# Patient Record
Sex: FEMALE | Race: WHITE | NOT HISPANIC OR LATINO | Employment: FULL TIME | URBAN - METROPOLITAN AREA
[De-identification: names, ages, dates, MRNs, and addresses within clinical notes are randomized per-mention and may not be internally consistent; named-entity substitution may affect disease eponyms.]

---

## 2023-09-08 PROBLEM — R40.0 DAYTIME SOMNOLENCE: Status: ACTIVE | Noted: 2023-09-08

## 2023-09-08 PROBLEM — J34.3 HYPERTROPHY OF BOTH INFERIOR NASAL TURBINATES: Status: ACTIVE | Noted: 2023-09-08

## 2023-09-08 PROBLEM — H25.10 NUCLEAR SENILE CATARACT: Status: ACTIVE | Noted: 2023-09-08

## 2023-09-08 PROBLEM — H02.834 DERMATOCHALASIS OF LEFT UPPER EYELID: Status: ACTIVE | Noted: 2023-09-08

## 2023-09-08 PROBLEM — G57.01 PIRIFORMIS SYNDROME, RIGHT: Status: ACTIVE | Noted: 2023-09-08

## 2023-09-08 PROBLEM — D35.3 BENIGN NEOPLASM OF PITUITARY GLAND AND CRANIOPHARYNGEAL DUCT (POUCH) (MULTI): Status: ACTIVE | Noted: 2023-09-08

## 2023-09-08 PROBLEM — H40.003 PREGLAUCOMA, UNSPECIFIED, BILATERAL: Status: ACTIVE | Noted: 2023-09-08

## 2023-09-08 PROBLEM — F41.0 PANIC DISORDER WITHOUT AGORAPHOBIA: Status: ACTIVE | Noted: 2023-09-08

## 2023-09-08 PROBLEM — H02.831 DERMATOCHALASIS OF RIGHT UPPER EYELID: Status: ACTIVE | Noted: 2023-09-08

## 2023-09-08 PROBLEM — I10 BENIGN ESSENTIAL HTN: Status: ACTIVE | Noted: 2023-09-08

## 2023-09-08 PROBLEM — G47.33 OBSTRUCTIVE SLEEP APNEA, ADULT: Status: ACTIVE | Noted: 2023-09-08

## 2023-09-08 PROBLEM — M54.16 LUMBAR RADICULOPATHY: Status: ACTIVE | Noted: 2023-09-08

## 2023-09-08 PROBLEM — E87.6 HYPOKALEMIA: Status: ACTIVE | Noted: 2023-09-08

## 2023-09-08 PROBLEM — R06.83 SNORING: Status: ACTIVE | Noted: 2023-09-08

## 2023-09-08 PROBLEM — H44.20 DEGENERATIVE PROGRESSIVE HIGH MYOPIA: Status: ACTIVE | Noted: 2023-09-08

## 2023-09-08 PROBLEM — F41.9 ANXIETY: Status: ACTIVE | Noted: 2023-09-08

## 2023-09-08 PROBLEM — H49.10 FOURTH NERVE PALSY: Status: ACTIVE | Noted: 2023-09-08

## 2023-09-08 PROBLEM — M54.30 SCIATICA: Status: ACTIVE | Noted: 2023-09-08

## 2023-09-08 PROBLEM — D35.2 BENIGN NEOPLASM OF PITUITARY GLAND AND CRANIOPHARYNGEAL DUCT (POUCH) (MULTI): Status: ACTIVE | Noted: 2023-09-08

## 2023-09-08 PROBLEM — E53.8 VITAMIN B12 DEFICIENCY: Status: ACTIVE | Noted: 2023-09-08

## 2023-09-08 RX ORDER — HYDROCHLOROTHIAZIDE 25 MG/1
1 TABLET ORAL
COMMUNITY
Start: 2016-07-16

## 2023-09-08 RX ORDER — CHOLECALCIFEROL (VITAMIN D3) 25 MCG
1 TABLET ORAL DAILY
COMMUNITY

## 2023-09-08 RX ORDER — BIOTIN 1 MG
1000 TABLET ORAL
COMMUNITY
Start: 2013-07-15

## 2023-09-08 RX ORDER — CYCLOBENZAPRINE HCL 10 MG
1 TABLET ORAL 3 TIMES DAILY PRN
COMMUNITY
Start: 2013-07-15 | End: 2024-04-01

## 2023-09-08 RX ORDER — OMEPRAZOLE 40 MG/1
1 CAPSULE, DELAYED RELEASE ORAL
COMMUNITY
End: 2023-10-09

## 2023-09-08 RX ORDER — POTASSIUM CHLORIDE 20 MEQ/1
1 TABLET, EXTENDED RELEASE ORAL 2 TIMES DAILY
COMMUNITY
Start: 2016-04-21 | End: 2023-12-19

## 2023-09-08 RX ORDER — BIOTIN 10 MG
1 TABLET ORAL DAILY
COMMUNITY

## 2023-09-08 RX ORDER — FOSINOPRIL SODIUM 40 MG/1
1 TABLET ORAL DAILY
COMMUNITY
End: 2024-05-16

## 2023-09-08 RX ORDER — AMLODIPINE BESYLATE 10 MG/1
1 TABLET ORAL DAILY
COMMUNITY
End: 2024-05-16

## 2023-09-08 RX ORDER — PAROXETINE 10 MG/1
3 TABLET, FILM COATED ORAL DAILY
COMMUNITY
End: 2024-02-13

## 2023-09-08 RX ORDER — ASPIRIN 81 MG/1
1 TABLET ORAL DAILY
COMMUNITY
Start: 2013-07-15

## 2023-09-08 RX ORDER — MULTIVIT WITH IRON,MINERALS
TABLET ORAL
COMMUNITY

## 2023-09-08 RX ORDER — PNV NO.95/FERROUS FUM/FOLIC AC 28MG-0.8MG
1 TABLET ORAL DAILY
COMMUNITY

## 2023-09-08 RX ORDER — METOPROLOL SUCCINATE 50 MG/1
1 TABLET, EXTENDED RELEASE ORAL DAILY
COMMUNITY
End: 2024-04-25

## 2023-09-08 RX ORDER — FLUTICASONE PROPIONATE 50 MCG
1 SPRAY, SUSPENSION (ML) NASAL DAILY
COMMUNITY

## 2023-10-08 DIAGNOSIS — K21.9 GASTROESOPHAGEAL REFLUX DISEASE, UNSPECIFIED WHETHER ESOPHAGITIS PRESENT: Primary | ICD-10-CM

## 2023-10-09 RX ORDER — OMEPRAZOLE 40 MG/1
40 CAPSULE, DELAYED RELEASE ORAL
Qty: 90 CAPSULE | Refills: 3 | Status: SHIPPED | OUTPATIENT
Start: 2023-10-09 | End: 2024-10-08

## 2023-11-20 ENCOUNTER — LAB (OUTPATIENT)
Dept: LAB | Facility: LAB | Age: 63
End: 2023-11-20
Payer: COMMERCIAL

## 2023-11-20 DIAGNOSIS — Z00.00 ENCOUNTER FOR GENERAL ADULT MEDICAL EXAMINATION WITHOUT ABNORMAL FINDINGS: Primary | ICD-10-CM

## 2023-11-20 DIAGNOSIS — D35.2 BENIGN NEOPLASM OF PITUITARY GLAND (MULTI): ICD-10-CM

## 2023-11-20 LAB
ALBUMIN SERPL-MCNC: 4.4 G/DL (ref 3.5–5)
ALP BLD-CCNC: 74 U/L (ref 35–125)
ALT SERPL-CCNC: 18 U/L (ref 5–40)
ANION GAP SERPL CALC-SCNC: 17 MMOL/L
AST SERPL-CCNC: 20 U/L (ref 5–40)
BASOPHILS # BLD AUTO: 0.03 X10*3/UL (ref 0–0.1)
BASOPHILS NFR BLD AUTO: 0.6 %
BILIRUB SERPL-MCNC: 0.4 MG/DL (ref 0.1–1.2)
BUN SERPL-MCNC: 19 MG/DL (ref 8–25)
CALCIUM SERPL-MCNC: 9.6 MG/DL (ref 8.5–10.4)
CHLORIDE SERPL-SCNC: 103 MMOL/L (ref 97–107)
CHOLEST SERPL-MCNC: 208 MG/DL (ref 133–200)
CHOLEST/HDLC SERPL: 3.8 {RATIO}
CO2 SERPL-SCNC: 23 MMOL/L (ref 24–31)
CREAT SERPL-MCNC: 0.9 MG/DL (ref 0.4–1.6)
EOSINOPHIL # BLD AUTO: 0.15 X10*3/UL (ref 0–0.7)
EOSINOPHIL NFR BLD AUTO: 3.2 %
ERYTHROCYTE [DISTWIDTH] IN BLOOD BY AUTOMATED COUNT: 13.4 % (ref 11.5–14.5)
GFR SERPL CREATININE-BSD FRML MDRD: 71 ML/MIN/1.73M*2
GLUCOSE SERPL-MCNC: 117 MG/DL (ref 65–99)
HCT VFR BLD AUTO: 38.2 % (ref 36–46)
HDLC SERPL-MCNC: 55 MG/DL
HGB BLD-MCNC: 12.3 G/DL (ref 12–16)
IMM GRANULOCYTES # BLD AUTO: 0.01 X10*3/UL (ref 0–0.7)
IMM GRANULOCYTES NFR BLD AUTO: 0.2 % (ref 0–0.9)
LDLC SERPL CALC-MCNC: 136 MG/DL (ref 65–130)
LYMPHOCYTES # BLD AUTO: 1.3 X10*3/UL (ref 1.2–4.8)
LYMPHOCYTES NFR BLD AUTO: 27.3 %
MCH RBC QN AUTO: 27.8 PG (ref 26–34)
MCHC RBC AUTO-ENTMCNC: 32.2 G/DL (ref 32–36)
MCV RBC AUTO: 86 FL (ref 80–100)
MONOCYTES # BLD AUTO: 0.49 X10*3/UL (ref 0.1–1)
MONOCYTES NFR BLD AUTO: 10.3 %
NEUTROPHILS # BLD AUTO: 2.78 X10*3/UL (ref 1.2–7.7)
NEUTROPHILS NFR BLD AUTO: 58.4 %
NRBC BLD-RTO: 0 /100 WBCS (ref 0–0)
PLATELET # BLD AUTO: 282 X10*3/UL (ref 150–450)
POTASSIUM SERPL-SCNC: 4 MMOL/L (ref 3.4–5.1)
PROLACTIN SERPL-MCNC: 13.3 UG/L (ref 3–20)
PROT SERPL-MCNC: 6.8 G/DL (ref 5.9–7.9)
RBC # BLD AUTO: 4.43 X10*6/UL (ref 4–5.2)
SODIUM SERPL-SCNC: 143 MMOL/L (ref 133–145)
TRIGL SERPL-MCNC: 84 MG/DL (ref 40–150)
WBC # BLD AUTO: 4.8 X10*3/UL (ref 4.4–11.3)

## 2023-11-20 PROCEDURE — 80053 COMPREHEN METABOLIC PANEL: CPT

## 2023-11-20 PROCEDURE — 80061 LIPID PANEL: CPT

## 2023-11-20 PROCEDURE — 85025 COMPLETE CBC W/AUTO DIFF WBC: CPT

## 2023-11-20 PROCEDURE — 84146 ASSAY OF PROLACTIN: CPT

## 2023-11-20 PROCEDURE — 36415 COLL VENOUS BLD VENIPUNCTURE: CPT

## 2023-12-19 DIAGNOSIS — I10 BENIGN ESSENTIAL HTN: Primary | ICD-10-CM

## 2023-12-19 RX ORDER — POTASSIUM CHLORIDE 1500 MG/1
20 TABLET, EXTENDED RELEASE ORAL
Qty: 180 TABLET | Refills: 0 | Status: SHIPPED | OUTPATIENT
Start: 2023-12-19 | End: 2024-03-18

## 2024-01-11 ENCOUNTER — TELEPHONE (OUTPATIENT)
Dept: PRIMARY CARE | Facility: CLINIC | Age: 64
End: 2024-01-11
Payer: COMMERCIAL

## 2024-01-11 DIAGNOSIS — Z12.31 VISIT FOR SCREENING MAMMOGRAM: Primary | ICD-10-CM

## 2024-01-11 NOTE — TELEPHONE ENCOUNTER
Needs order for 3D mammogram placed, as  always ordered them. Since Dr Lundberg  isn't in the office, would you or Dr SALINAS order her mammogram? Please advise patient when order is in the system, or if he needs to take an alternate route.

## 2024-02-02 ENCOUNTER — HOSPITAL ENCOUNTER (OUTPATIENT)
Dept: RADIOLOGY | Facility: CLINIC | Age: 64
Discharge: HOME | End: 2024-02-02
Payer: MEDICARE

## 2024-02-02 VITALS — HEIGHT: 64 IN | WEIGHT: 175 LBS | BODY MASS INDEX: 29.88 KG/M2

## 2024-02-02 DIAGNOSIS — Z12.31 VISIT FOR SCREENING MAMMOGRAM: ICD-10-CM

## 2024-02-02 PROCEDURE — 77067 SCR MAMMO BI INCL CAD: CPT

## 2024-02-13 DIAGNOSIS — F41.9 ANXIETY: Primary | ICD-10-CM

## 2024-02-13 RX ORDER — PAROXETINE 10 MG/1
TABLET, FILM COATED ORAL
Qty: 270 TABLET | Refills: 0 | Status: SHIPPED | OUTPATIENT
Start: 2024-02-13

## 2024-02-21 ENCOUNTER — CLINICAL SUPPORT (OUTPATIENT)
Dept: OPHTHALMOLOGY | Facility: CLINIC | Age: 64
End: 2024-02-21
Payer: COMMERCIAL

## 2024-02-21 ENCOUNTER — OFFICE VISIT (OUTPATIENT)
Dept: OPHTHALMOLOGY | Facility: CLINIC | Age: 64
End: 2024-02-21
Payer: COMMERCIAL

## 2024-02-21 ENCOUNTER — APPOINTMENT (OUTPATIENT)
Dept: OPHTHALMOLOGY | Facility: CLINIC | Age: 64
End: 2024-02-21
Payer: COMMERCIAL

## 2024-02-21 DIAGNOSIS — H40.003 PREGLAUCOMA, UNSPECIFIED, BILATERAL: Primary | ICD-10-CM

## 2024-02-21 DIAGNOSIS — H49.12 LEFT TROCHLEAR NERVE PALSY: ICD-10-CM

## 2024-02-21 DIAGNOSIS — H44.23 BILATERAL DEGENERATIVE PROGRESSIVE HIGH MYOPIA: ICD-10-CM

## 2024-02-21 DIAGNOSIS — H25.813 COMBINED FORMS OF AGE-RELATED CATARACT OF BOTH EYES: ICD-10-CM

## 2024-02-21 PROCEDURE — 99214 OFFICE O/P EST MOD 30 MIN: CPT | Performed by: OPHTHALMOLOGY

## 2024-02-21 PROCEDURE — 92133 CPTRZD OPH DX IMG PST SGM ON: CPT | Performed by: OPHTHALMOLOGY

## 2024-02-21 RX ORDER — SPIRONOLACTONE 25 MG/1
TABLET ORAL
COMMUNITY
Start: 2010-03-11

## 2024-02-21 ASSESSMENT — REFRACTION_WEARINGRX
OD_SPHERE: -5.50
OS_ADD: +2.50
OD_ADD: +2.50
OD_AXIS: 119
OD_CYLINDER: -1.25
OS_AXIS: 073
OS_SPHERE: -4.50
OS_CYLINDER: -2.25

## 2024-02-21 ASSESSMENT — PATIENT HEALTH QUESTIONNAIRE - PHQ9
2. FEELING DOWN, DEPRESSED OR HOPELESS: NOT AT ALL
SUM OF ALL RESPONSES TO PHQ9 QUESTIONS 1 AND 2: 0
1. LITTLE INTEREST OR PLEASURE IN DOING THINGS: NOT AT ALL

## 2024-02-21 ASSESSMENT — SLIT LAMP EXAM - LIDS
COMMENTS: 1+ BLEPHARITIS, 1+ DERMATOCHALASIS - UPPER LID
COMMENTS: 1+ BLEPHARITIS, 1+ DERMATOCHALASIS - UPPER LID

## 2024-02-21 ASSESSMENT — VISUAL ACUITY
OD_CC: 20/30
OD_CC+: -2
OS_CC+: -2
CORRECTION_TYPE: GLASSES
METHOD: SNELLEN - LINEAR
OS_CC: 20/30

## 2024-02-21 ASSESSMENT — ENCOUNTER SYMPTOMS
RESPIRATORY NEGATIVE: 0
CONSTITUTIONAL NEGATIVE: 0
CARDIOVASCULAR NEGATIVE: 0
PSYCHIATRIC NEGATIVE: 0
GASTROINTESTINAL NEGATIVE: 1
EYES NEGATIVE: 0
HEMATOLOGIC/LYMPHATIC NEGATIVE: 0
LOSS OF SENSATION IN FEET: 0
MUSCULOSKELETAL NEGATIVE: 0
ENDOCRINE NEGATIVE: 0
NEUROLOGICAL NEGATIVE: 0
OCCASIONAL FEELINGS OF UNSTEADINESS: 0
ALLERGIC/IMMUNOLOGIC NEGATIVE: 0
DEPRESSION: 0

## 2024-02-21 ASSESSMENT — PACHYMETRY
OS_CT(UM): 615
OD_CT(UM): 615

## 2024-02-21 ASSESSMENT — TONOMETRY
IOP_METHOD: GOLDMANN APPLANATION
OS_IOP_MMHG: 18
OD_IOP_MMHG: 18

## 2024-02-21 ASSESSMENT — EXTERNAL EXAM - RIGHT EYE: OD_EXAM: NORMAL

## 2024-02-21 ASSESSMENT — CUP TO DISC RATIO
OS_RATIO: 0.5
OD_RATIO: 0.6

## 2024-02-21 ASSESSMENT — PAIN SCALES - GENERAL: PAINLEVEL: 0-NO PAIN

## 2024-02-21 ASSESSMENT — EXTERNAL EXAM - LEFT EYE: OS_EXAM: NORMAL

## 2024-02-21 NOTE — PROGRESS NOTES
Subjective   Patient ID: Rae Davis is a 64 y.o. female.    Chief Complaint    Annual Exam       HPI    PT SAW OPTOM FOR GLASSES AND soft contact lens (SCL) EXAM. PT DENIES REFRACTION.    Complete and pre-glaucoma exam.  Being evaluated for GI issues.  Vision is good and stable.  No new problems or complaints.      Last edited by Tay Sanderson MD on 2/21/2024 11:23 AM.        No current outpatient medications on file. (Ophthalmology pharm classes)       Current Outpatient Medications (Other)   Medication Sig Dispense Refill    amLODIPine (Norvasc) 10 mg tablet Take 1 tablet (10 mg) by mouth once daily.      biotin 10 mg tablet Take 1 tablet (10 mg) by mouth once daily. FOR 30 DAYS      cholecalciferol (Vitamin D-3) 25 MCG (1000 UT) tablet Take 1 capsule by mouth once daily.      cyanocobalamin (Vitamin B-12) 100 mcg tablet Take 1 tablet (100 mcg) by mouth once daily.      cyclobenzaprine (Flexeril) 10 mg tablet Take 1 tablet (10 mg) by mouth 3 times a day as needed.      fluticasone (Flonase Allergy Relief) 50 mcg/actuation nasal spray Administer 1 spray into each nostril once daily. FOR 30 DAYS      fosinopril (Monopril) 40 mg tablet Take 1 tablet (40 mg) by mouth once daily.      hydroCHLOROthiazide (HYDRODiuril) 25 mg tablet Take 1 tablet (25 mg) by mouth once daily in the morning. Take before meals.      metoprolol succinate XL (Toprol-XL) 50 mg 24 hr tablet Take 1 tablet (50 mg) by mouth once daily.      multivitamin-Ca-iron-minerals tablet Take by mouth. As directed      omeprazole (PriLOSEC) 40 mg DR capsule Take 1 capsule (40 mg) by mouth once daily in the morning. Take before meals. 90 capsule 3    PARoxetine (Paxil) 10 mg tablet TAKE 3 TABLETS ONCE DAILY 270 tablet 0    pedi multivit 43-iron fumarate (Flintstones Complete, iron,) 18 mg iron tablet,chewable Chew 1 tablet. When donating blood      potassium chloride CR (Klor-Con M20) 20 mEq ER tablet TAKE 1 TABLET TWICE A DAY WITH FOOD 180 tablet 0     spironolactone (Aldactone) 25 mg tablet Take by mouth.      aspirin 81 mg EC tablet Take 1 tablet (81 mg) by mouth once daily.      biotin 1 mg tablet Take 1 tablet (1,000 mcg) by mouth. As directed      potassium gluconate 2.5 mEq tablet Take by mouth. As directed         Objective   Base Eye Exam       Visual Acuity (Snellen - Linear)         Right Left Both    Dist cc 20/30 -2 20/30 -2     Near cc   J2      Correction: Glasses              Tonometry (Goldmann Applanation, 10:08 AM)         Right Left    Pressure 18 18              Pupils         Dark Shape React APD    Right 4 Round 1 None    Left 4 Round 1 None              Extraocular Movement         Right Left     Full Full              Dilation       Both eyes: 1% Tropic 2.5% Phen @ 10:08 AM                  Slit Lamp and Fundus Exam       External Exam         Right Left    External Normal Normal              Slit Lamp Exam         Right Left    Lids/Lashes 1+ Blepharitis, 1+ Dermatochalasis - upper lid 1+ Blepharitis, 1+ Dermatochalasis - upper lid    Conjunctiva/Sclera normal bulbar and palepbral conjunctiva normal bulbar and palepbral conjunctiva    Cornea normal epi/stroma/endo and tear film normal epi/stroma/endo and tear film    Anterior Chamber normal anterior chamber, deep and quiet normal anterior chamber, deep and quiet    Iris pupil miotic pupil miotic    Lens 1+ Nuclear sclerosis, 1+ Cortical cataract 1+ Nuclear sclerosis, 1+ Cortical cataract    Anterior Vitreous vitreous syneresis vitreous syneresis              Fundus Exam         Right Left    Disc sloping rim sloping rim    C/D Ratio 0.6 0.5    Macula normal macula normal macula    Vessels normal retinal vessels normal retinal vessels    Periphery normal retinal periphery normal retinal periphery                  Refraction       Wearing Rx         Sphere Cylinder Axis Add    Right -5.50 -1.25 119 +2.50    Left -4.50 -2.25 073 +2.50                    Assessment/Plan   Problem List Items  Addressed This Visit          Eye/Vision problems    Degenerative progressive high myopia    Fourth nerve palsy    Preglaucoma, unspecified, bilateral - Primary     F/u one year full with oct nerve and hvf.          Relevant Orders    OCT, Optic Nerve - OU - Both Eyes (Completed)    Combined forms of age-related cataract of both eyes     Not visually significant.

## 2024-03-07 ENCOUNTER — APPOINTMENT (OUTPATIENT)
Dept: PRIMARY CARE | Facility: CLINIC | Age: 64
End: 2024-03-07
Payer: COMMERCIAL

## 2024-03-18 DIAGNOSIS — I10 BENIGN ESSENTIAL HTN: ICD-10-CM

## 2024-03-18 RX ORDER — POTASSIUM CHLORIDE 1500 MG/1
20 TABLET, EXTENDED RELEASE ORAL
Qty: 180 TABLET | Refills: 3 | Status: SHIPPED | OUTPATIENT
Start: 2024-03-18

## 2024-03-19 ENCOUNTER — APPOINTMENT (OUTPATIENT)
Dept: PRIMARY CARE | Facility: CLINIC | Age: 64
End: 2024-03-19
Payer: COMMERCIAL

## 2024-04-01 DIAGNOSIS — M54.16 LUMBAR RADICULOPATHY: Primary | ICD-10-CM

## 2024-04-01 RX ORDER — CYCLOBENZAPRINE HCL 10 MG
10 TABLET ORAL 3 TIMES DAILY PRN
Qty: 180 TABLET | Refills: 0 | Status: SHIPPED | OUTPATIENT
Start: 2024-04-01

## 2024-04-25 DIAGNOSIS — I10 BENIGN ESSENTIAL HTN: Primary | ICD-10-CM

## 2024-04-25 RX ORDER — METOPROLOL SUCCINATE 50 MG/1
50 TABLET, EXTENDED RELEASE ORAL DAILY
Qty: 30 TABLET | Refills: 0 | Status: SHIPPED | OUTPATIENT
Start: 2024-04-25

## 2024-05-13 DIAGNOSIS — I10 BENIGN ESSENTIAL HTN: Primary | ICD-10-CM

## 2024-05-16 RX ORDER — FOSINOPRIL SODIUM 40 MG/1
40 TABLET ORAL DAILY
Qty: 90 TABLET | Refills: 0 | Status: SHIPPED | OUTPATIENT
Start: 2024-05-16

## 2024-05-16 RX ORDER — AMLODIPINE BESYLATE 10 MG/1
10 TABLET ORAL DAILY
Qty: 90 TABLET | Refills: 0 | Status: SHIPPED | OUTPATIENT
Start: 2024-05-16

## 2024-05-16 NOTE — TELEPHONE ENCOUNTER
Covering provider.  Overdue for checkup, needs to see her primary care provider for further refills.  Will send a temporary refill at this time but I do not know what her blood pressure numbers have been as of late to be able to say that treatment is effective

## 2024-06-26 ENCOUNTER — TELEPHONE (OUTPATIENT)
Dept: PRIMARY CARE | Facility: CLINIC | Age: 64
End: 2024-06-26
Payer: COMMERCIAL

## 2024-06-26 DIAGNOSIS — F41.9 ANXIETY: ICD-10-CM

## 2024-06-26 DIAGNOSIS — I10 BENIGN ESSENTIAL HTN: ICD-10-CM

## 2024-06-26 RX ORDER — METOPROLOL SUCCINATE 50 MG/1
TABLET, EXTENDED RELEASE ORAL
Qty: 30 TABLET | Refills: 11 | OUTPATIENT
Start: 2024-06-26

## 2024-07-01 RX ORDER — AMLODIPINE BESYLATE 10 MG/1
10 TABLET ORAL DAILY
Qty: 90 TABLET | Refills: 0 | Status: SHIPPED | OUTPATIENT
Start: 2024-07-01

## 2024-07-01 RX ORDER — HYDROCHLOROTHIAZIDE 25 MG/1
25 TABLET ORAL
Qty: 90 TABLET | Refills: 0 | Status: SHIPPED | OUTPATIENT
Start: 2024-07-01

## 2024-07-01 RX ORDER — METOPROLOL SUCCINATE 50 MG/1
50 TABLET, EXTENDED RELEASE ORAL DAILY
Qty: 90 TABLET | Refills: 0 | Status: SHIPPED | OUTPATIENT
Start: 2024-07-01

## 2024-07-01 RX ORDER — PAROXETINE 10 MG/1
30 TABLET, FILM COATED ORAL DAILY
Qty: 270 TABLET | Refills: 0 | Status: SHIPPED | OUTPATIENT
Start: 2024-07-01

## 2024-07-01 NOTE — TELEPHONE ENCOUNTER
Rae called as she is out of her Metoprolol and Paxil. She is asking for them to be sent to [facility] to get her through until the mail order comes in.     REFILLS:  Metoprolol  Paxil  Amlodipine  Hydrochlorothiazide

## 2024-08-12 DIAGNOSIS — I10 BENIGN ESSENTIAL HTN: ICD-10-CM

## 2024-08-15 ENCOUNTER — APPOINTMENT (OUTPATIENT)
Dept: PRIMARY CARE | Facility: CLINIC | Age: 64
End: 2024-08-15
Payer: MEDICARE

## 2024-08-15 RX ORDER — FOSINOPRIL SODIUM 40 MG/1
40 TABLET ORAL DAILY
Qty: 90 TABLET | Refills: 0 | Status: SHIPPED | OUTPATIENT
Start: 2024-08-15

## 2024-08-15 NOTE — TELEPHONE ENCOUNTER
She is booked for a physical day. Needs a med FU appt as soon as possible, please schedule and inform patient this is separate from physical

## 2024-08-26 ENCOUNTER — APPOINTMENT (OUTPATIENT)
Dept: PRIMARY CARE | Facility: CLINIC | Age: 64
End: 2024-08-26
Payer: COMMERCIAL

## 2024-09-04 ENCOUNTER — APPOINTMENT (OUTPATIENT)
Dept: PRIMARY CARE | Facility: CLINIC | Age: 64
End: 2024-09-04
Payer: MEDICARE

## 2024-09-16 DIAGNOSIS — I10 BENIGN ESSENTIAL HTN: ICD-10-CM

## 2024-09-16 RX ORDER — HYDROCHLOROTHIAZIDE 25 MG/1
25 TABLET ORAL
Qty: 90 TABLET | Refills: 0 | OUTPATIENT
Start: 2024-09-16

## 2024-09-16 RX ORDER — HYDROCHLOROTHIAZIDE 25 MG/1
25 TABLET ORAL
Qty: 90 TABLET | Refills: 3 | Status: SHIPPED | OUTPATIENT
Start: 2024-09-16

## 2024-10-07 ENCOUNTER — OFFICE VISIT (OUTPATIENT)
Dept: PRIMARY CARE | Facility: CLINIC | Age: 64
End: 2024-10-07
Payer: MEDICARE

## 2024-10-07 VITALS
TEMPERATURE: 99.3 F | SYSTOLIC BLOOD PRESSURE: 130 MMHG | DIASTOLIC BLOOD PRESSURE: 82 MMHG | RESPIRATION RATE: 16 BRPM | HEIGHT: 64 IN | HEART RATE: 83 BPM | BODY MASS INDEX: 31.58 KG/M2 | WEIGHT: 185 LBS | OXYGEN SATURATION: 96 %

## 2024-10-07 DIAGNOSIS — E66.811 CLASS 1 OBESITY DUE TO EXCESS CALORIES WITH BODY MASS INDEX (BMI) OF 31.0 TO 31.9 IN ADULT, UNSPECIFIED WHETHER SERIOUS COMORBIDITY PRESENT: ICD-10-CM

## 2024-10-07 DIAGNOSIS — Z80.3 FAMILY HISTORY OF BREAST CANCER: ICD-10-CM

## 2024-10-07 DIAGNOSIS — Z91.89 AT HIGH RISK FOR BREAST CANCER: ICD-10-CM

## 2024-10-07 DIAGNOSIS — D35.3 BENIGN NEOPLASM OF PITUITARY GLAND AND CRANIOPHARYNGEAL DUCT (POUCH) (MULTI): ICD-10-CM

## 2024-10-07 DIAGNOSIS — I10 BENIGN ESSENTIAL HTN: ICD-10-CM

## 2024-10-07 DIAGNOSIS — Z12.39 BREAST CANCER SCREENING OTHER THAN MAMMOGRAM: ICD-10-CM

## 2024-10-07 DIAGNOSIS — Z13.1 DIABETES MELLITUS SCREENING: ICD-10-CM

## 2024-10-07 DIAGNOSIS — F41.9 ANXIETY: ICD-10-CM

## 2024-10-07 DIAGNOSIS — E66.09 CLASS 1 OBESITY DUE TO EXCESS CALORIES WITH BODY MASS INDEX (BMI) OF 31.0 TO 31.9 IN ADULT, UNSPECIFIED WHETHER SERIOUS COMORBIDITY PRESENT: ICD-10-CM

## 2024-10-07 DIAGNOSIS — Z11.59 ENCOUNTER FOR HEPATITIS C SCREENING TEST FOR LOW RISK PATIENT: ICD-10-CM

## 2024-10-07 DIAGNOSIS — Z12.31 BREAST CANCER SCREENING BY MAMMOGRAM: ICD-10-CM

## 2024-10-07 DIAGNOSIS — Z76.89 ESTABLISHING CARE WITH NEW DOCTOR, ENCOUNTER FOR: ICD-10-CM

## 2024-10-07 DIAGNOSIS — Z78.0 POSTMENOPAUSAL: ICD-10-CM

## 2024-10-07 DIAGNOSIS — Z00.00 MEDICARE ANNUAL WELLNESS VISIT, SUBSEQUENT: Primary | ICD-10-CM

## 2024-10-07 DIAGNOSIS — D35.2 BENIGN NEOPLASM OF PITUITARY GLAND AND CRANIOPHARYNGEAL DUCT (POUCH) (MULTI): ICD-10-CM

## 2024-10-07 PROCEDURE — 1158F ADVNC CARE PLAN TLK DOCD: CPT | Performed by: FAMILY MEDICINE

## 2024-10-07 PROCEDURE — 3079F DIAST BP 80-89 MM HG: CPT | Performed by: FAMILY MEDICINE

## 2024-10-07 PROCEDURE — 3008F BODY MASS INDEX DOCD: CPT | Performed by: FAMILY MEDICINE

## 2024-10-07 PROCEDURE — 99214 OFFICE O/P EST MOD 30 MIN: CPT | Performed by: FAMILY MEDICINE

## 2024-10-07 PROCEDURE — 3075F SYST BP GE 130 - 139MM HG: CPT | Performed by: FAMILY MEDICINE

## 2024-10-07 PROCEDURE — G0439 PPPS, SUBSEQ VISIT: HCPCS | Performed by: FAMILY MEDICINE

## 2024-10-07 PROCEDURE — 1159F MED LIST DOCD IN RCRD: CPT | Performed by: FAMILY MEDICINE

## 2024-10-07 PROCEDURE — 1160F RVW MEDS BY RX/DR IN RCRD: CPT | Performed by: FAMILY MEDICINE

## 2024-10-07 PROCEDURE — 1123F ACP DISCUSS/DSCN MKR DOCD: CPT | Performed by: FAMILY MEDICINE

## 2024-10-07 PROCEDURE — 1126F AMNT PAIN NOTED NONE PRSNT: CPT | Performed by: FAMILY MEDICINE

## 2024-10-07 PROCEDURE — 1170F FXNL STATUS ASSESSED: CPT | Performed by: FAMILY MEDICINE

## 2024-10-07 RX ORDER — FOSINOPRIL SODIUM 40 MG/1
40 TABLET ORAL DAILY
Qty: 90 TABLET | Refills: 1 | Status: SHIPPED | OUTPATIENT
Start: 2024-10-07

## 2024-10-07 RX ORDER — AMLODIPINE BESYLATE 10 MG/1
10 TABLET ORAL DAILY
Qty: 90 TABLET | Refills: 1 | Status: SHIPPED | OUTPATIENT
Start: 2024-10-07

## 2024-10-07 RX ORDER — POTASSIUM CHLORIDE 20 MEQ/1
20 TABLET, EXTENDED RELEASE ORAL
Qty: 180 TABLET | Refills: 1 | Status: SHIPPED | OUTPATIENT
Start: 2024-10-07

## 2024-10-07 RX ORDER — PAROXETINE 10 MG/1
30 TABLET, FILM COATED ORAL DAILY
Qty: 270 TABLET | Refills: 1 | Status: SHIPPED | OUTPATIENT
Start: 2024-10-07

## 2024-10-07 RX ORDER — HYDROCHLOROTHIAZIDE 25 MG/1
25 TABLET ORAL
Qty: 90 TABLET | Refills: 0 | Status: SHIPPED | OUTPATIENT
Start: 2024-10-07

## 2024-10-07 RX ORDER — METOPROLOL SUCCINATE 50 MG/1
50 TABLET, EXTENDED RELEASE ORAL DAILY
Qty: 90 TABLET | Refills: 1 | Status: SHIPPED | OUTPATIENT
Start: 2024-10-07

## 2024-10-07 ASSESSMENT — ANXIETY QUESTIONNAIRES
4. TROUBLE RELAXING: NOT AT ALL
GAD7 TOTAL SCORE: 1
2. NOT BEING ABLE TO STOP OR CONTROL WORRYING: NOT AT ALL
6. BECOMING EASILY ANNOYED OR IRRITABLE: NOT AT ALL
5. BEING SO RESTLESS THAT IT IS HARD TO SIT STILL: SEVERAL DAYS
3. WORRYING TOO MUCH ABOUT DIFFERENT THINGS: NOT AT ALL
1. FEELING NERVOUS, ANXIOUS, OR ON EDGE: NOT AT ALL
7. FEELING AFRAID AS IF SOMETHING AWFUL MIGHT HAPPEN: NOT AT ALL

## 2024-10-07 ASSESSMENT — ACTIVITIES OF DAILY LIVING (ADL)
USING TELEPHONE: INDEPENDENT
DOING HOUSEWORK: INDEPENDENT
TOILETING: INDEPENDENT
HEARING - LEFT EAR: FUNCTIONAL
BATHING: INDEPENDENT
ASSISTIVE_DEVICE: EYEGLASSES
PREPARING MEALS: INDEPENDENT
GROCERY SHOPPING: INDEPENDENT
PILL BOX USED: YES
DOING_HOUSEWORK: INDEPENDENT
PATIENT'S MEMORY ADEQUATE TO SAFELY COMPLETE DAILY ACTIVITIES?: YES
WALKS IN HOME: INDEPENDENT
JUDGMENT_ADEQUATE_SAFELY_COMPLETE_DAILY_ACTIVITIES: YES
JUDGMENT_ADEQUATE_SAFELY_COMPLETE_DAILY_ACTIVITIES: YES
HEARING - RIGHT EAR: FUNCTIONAL
FEEDING YOURSELF: INDEPENDENT
MANAGING_FINANCES: INDEPENDENT
DRESSING: INDEPENDENT
TAKING MEDICATION: INDEPENDENT
BATHING: INDEPENDENT
EATING: INDEPENDENT
GROCERY_SHOPPING: INDEPENDENT
MANAGING FINANCES: INDEPENDENT
ADEQUATE_TO_COMPLETE_ADL: YES
DRESSING YOURSELF: INDEPENDENT
TAKING_MEDICATION: INDEPENDENT
USING TRANSPORTATION: INDEPENDENT
GROOMING: INDEPENDENT
STIL DRIVING: YES
ADEQUATE_TO_COMPLETE_ADL: YES
NEEDS ASSISTANCE WITH FOOD: INDEPENDENT

## 2024-10-07 ASSESSMENT — COLUMBIA-SUICIDE SEVERITY RATING SCALE - C-SSRS
6. HAVE YOU EVER DONE ANYTHING, STARTED TO DO ANYTHING, OR PREPARED TO DO ANYTHING TO END YOUR LIFE?: NO
1. IN THE PAST MONTH, HAVE YOU WISHED YOU WERE DEAD OR WISHED YOU COULD GO TO SLEEP AND NOT WAKE UP?: NO
2. HAVE YOU ACTUALLY HAD ANY THOUGHTS OF KILLING YOURSELF?: NO

## 2024-10-07 ASSESSMENT — ENCOUNTER SYMPTOMS
DEPRESSION: 0
LOSS OF SENSATION IN FEET: 1
OCCASIONAL FEELINGS OF UNSTEADINESS: 0

## 2024-10-07 ASSESSMENT — PAIN SCALES - GENERAL: PAINLEVEL: 0-NO PAIN

## 2024-10-07 NOTE — PATIENT INSTRUCTIONS
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) 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.2, D35.3    Relevant Orders    Prolactin level     Other Visit Diagnoses         Codes    Medicare annual wellness visit, subsequent    -  Primary Z00.00    Relevant Orders    Comprehensive Metabolic Panel    Lipid Panel    CBC    TSH with reflex to Free T4 if abnormal    Hemoglobin A1c    Insulin, Fasting    Establishing care with new doctor, encounter for     Z76.89    Breast cancer screening by mammogram     Z12.31    Relevant Orders    BI mammo bilateral screening tomosynthesis    Postmenopausal     Z78.0    Relevant Orders    XR DEXA bone density    Encounter for hepatitis C screening test for low risk patient     Z11.59    Relevant Orders    Hepatitis C Antibody    Class 1 obesity due to excess calories with body mass index (BMI) of 31.0 to 31.9 in adult, unspecified whether serious comorbidity present     E66.811, E66.09, Z68.31    Relevant Orders    Hemoglobin A1c    Insulin, Fasting    Diabetes mellitus screening     Z13.1    Relevant Orders    Hemoglobin A1c    Insulin, Fasting    At high risk for breast cancer     Z91.89    Relevant Orders    Blood Urea Nitrogen    Creatinine    MR breast bilateral w contrast full protocol    Breast cancer screening other than mammogram     Z12.39    Relevant Orders    Blood Urea Nitrogen    Creatinine    MR breast bilateral w contrast full  protocol    Family history of breast cancer     Z80.3    Relevant Orders    Blood Urea Nitrogen    Creatinine    MR breast bilateral w contrast full protocol            Additional Visit Plans:  Notes:  PREVENTATIVE HEALTH SCREENINGS INCLUDED:  - Blood pressure screen.  - Blood work may include a cholesterol and diabetes screen if risk factors exist (overweight, high blood pressure etc); screening for sexually transmitted infections; a one time HIV screen for all individuals, and Hepatitis C Virus screening  - I encourage you to eat a low-fat, moderate-carbohydrate, low-calorie diet to maintain a normal BMI (under 25) to reduce heart disease, and risk for diabetes  - Moderate intensity exercise for 30 minutes 5 days per week is recommended  - Helpful resources recommended by the American Academy of Family Practice can be found at www.familydoctor.org or www.choosemyplate.gov  - Can also consider enrolling in a program such as Weight Watchers or Carina Joshua. The most effective diet is going to be one you can follow long term and turn into a lifestyle. The CDC recommends losing 0.5-2 lbs a week if overweight or obese.  - I recommend a routine vision check and dental cleanings every 6 months    - Colon cancer screening for all ages 45-75 or 85 years old periodically depending on results.  Up-to-date with Cologuard, next due 2027  - Cervical cancer screening (pap test) in women between 21-65 years old, periodically depending on results. N/A  - Mammogram screening for breast cancer in women starting at 40-50 years and every 1-2 years. Due in Feb but due for screening Mri now, ordered  - Bone density screening (DEXA) for osteoporosis in women aged 65 years and older, once every two years if needed. Due now, ordered    IMMUNIZATIONS:  - Flu shot annually.  Due at this time, plan to get at pharmacy  - Tetanus booster every 10 years.  Up-to-date, due 2030  - Two pneumococcal vaccinations after 65 years old.  Due at this time,  plan to get at pharmacy  - Post-A-Vox vaccine for those 50 years or older.  Completed    This was a shared decision making visit.    Blood pressure is controlled. If anything becomes uncontrolled we will see if we can simplify your regimen. For now stay on this.     Mood is well controlled.     Plan to check labs for considering a glp1-agonist. Will have you come back for a dedicated visit to discuss how this works.     Next Wellness Exam Due  In 1 year from today      Ranjit Sarah DO   10/07/24   1:40 PM

## 2024-10-07 NOTE — PROGRESS NOTES
Outpatient Visit Note    Chief Complaint   Patient presents with    Annual Exam       HPI:  Rae Davis is a 64 y.o. female here  for a Medicare Wellness Visit and to establish care.  She would also like to discuss her medications.    Previous PCP is Dr. Lundberg.    She has a history of pituitary adenoma, Dx in 1994. This has been watched over time. Initially had nipple discharge. Saw endocrinology. Took a medicine for this and her numbers went down. Benign adenoma.  Prolactin level 10 months ago was normal.    She has anxiety for which she is on 30 mg of paroxetine daily. History of panic attacks.  States symptoms are manageable on this, no concerning side effects.  Has good support.  No suicidal or homicidal ideation.    She has lumbar radiculopathy for which she was on Flexeril as needed. Sometimes get sciatica or piriformis muscle tension. The area has been ok for now. Takes IBU as needed.     For hypertension she is on amlodipine, hydrochlorothiazide with Monopril, potassium supplementation and metoprolol succinate.  She does not see cardiology.  She is happy with this combination. Denies any headache, blurry vision, nosebleeds, chest pain, shortness of breath, dizziness or lower extremity edema.  No concerning side effects. Does not measure BPs at home.     She does follow closely with GI for her reflux, is on omeprazole.    Unable to lose weight with lifestyle modifications.     Health Maintenance  Immunizations: Shingrix completed.  Tdap is due 2030.  Pneumococcal vaccination is due.  Influenza is due.    Denies smoking or illicit drug use, drinks 3 alcoholic beverages a week. Patient reports routine vision checks and dental cleanings, and regular exercise with classes at the gym. Patient is not fasting for routine blood work today.     Cologuard done 2024, next due 2027.  Screening pap N/A. Screening mammogram is due in February. DEXA is due. Had genetic testing done due to high risk. Would like to  continue her yearly breast MRI screening.     Otherwise denies fevers, chills, cold/flu symptoms, SOB, CP, N/V, abdominal pain, dysuria, hematuria, melena, diarrhea or LE edema     Advanced directives: in place      Living Will: Not Received    Healthcare Power of Atty: Not Received     Hearing screen: reports no difficulty with hearing     Does the patient use opioid medications: No     How does the patient rate their health status today: excellent    Cognitive Screen:  AAAx3  to person, place and time: Yes  3 word recall: Apple, Car, Shoe - Immediate recall: Yes         - 5 minutes recall: Yes, 2 out of 3   Impression: minimal cognitive deficiency observed during screening / encounter today      Reviewed:   Past Medical History/Allergies:  Yes  Family History:  Yes  Social History:  Yes  Current Medications:  Yes  Vital Signs:  Yes  Advanced Directives:  discussed  Immunizations:  reviewed today  Home Safety:                    Up & Go test > 30 seconds?  No                   Home have rugs; lack grab bars in bathroom; lack handrail on stairs; have poor lighting?  No                   Hearing difficulties?  No  Geriatric Assessment                   ADL areas requiring assistance:  Does not need help with Dressing, Eating, Ambulating, Toileting, Grooming, Hygiene.                    IADL areas requiring assistance:  Does not need help with Shopping, Housework, Accounting, Transportation, Driving.   Medications: reviewed  Current supplements:  Reviewed and recorded.   Other providers: Reviewed and recorded - Current providers and suppliers: Dr. Sarah, Dr. Andujar, Dr. Sanderson,     Vision results:  No results found.     PHQ9/GAD7:  Over the last 2 weeks, how often have you been bothered by any of the following problems?  Feeling nervous, anxious, or on edge: Not at all  Not being able to stop or control worrying: Not at all  Worrying too much about different things: Not at all  Trouble relaxing: Not at all  Being so  restless that it is hard to sit still: Several days  Becoming easily annoyed or irritable: Not at all  Feeling afraid as if something awful might happen: Not at all  AV-7 Total Score: 1      Patient Active Problem List    Diagnosis Date Noted    Anxiety 09/08/2023    Benign essential HTN 09/08/2023    Benign neoplasm of pituitary gland and craniopharyngeal duct (pouch) (Multi) 09/08/2023    Daytime somnolence 09/08/2023    Degenerative progressive high myopia 09/08/2023    Dermatochalasis of left upper eyelid 09/08/2023    Dermatochalasis of right upper eyelid 09/08/2023    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    Hypertrophy of both inferior nasal turbinates 09/08/2023    Vitamin B12 deficiency 09/08/2023    Snoring 09/08/2023    Sciatica 09/08/2023    Preglaucoma, unspecified, bilateral 09/08/2023    Piriformis syndrome, right 09/08/2023    Panic disorder without agoraphobia 09/08/2023    Obstructive sleep apnea, adult 09/08/2023    Combined forms of age-related cataract of both eyes 09/08/2023    Lumbar radiculopathy 09/08/2023    Hypokalemia 09/08/2023        Past Medical History:   Diagnosis Date    Age-related nuclear cataract of both eyes     Allergy status to unspecified drugs, medicaments and biological substances     History of allergy    Benign neoplasm of pituitary gland (Multi)     Prolactinoma    Chalazion of right upper eyelid     Deficiency of other specified B group vitamins     Vitamin B12 deficiency    Degenerative myopia, bilateral     Fourth nerve palsy of left eye     Ocular pain, right     Personal history of diseases of the skin and subcutaneous tissue     History of rosacea    Personal history of other diseases of the circulatory system     History of hypertension    Personal history of other diseases of the nervous system and sense organs     History of migraine headaches    Personal history of other diseases of the nervous system and sense organs     History of glaucoma    Preglaucoma,  unspecified, bilateral     Radiculopathy, lumbar region 08/26/2013    Lumbar radiculopathy    Unspecified disorder of binocular movement         Current Medications  Current Outpatient Medications   Medication Instructions    amLODIPine (NORVASC) 10 mg, oral, Daily    biotin 10 mg tablet 1 tablet, oral, Daily, FOR 30 DAYS     cholecalciferol (Vitamin D-3) 25 MCG (1000 UT) tablet 1 capsule, oral, Daily    cyanocobalamin (Vitamin B-12) 100 mcg tablet 1 tablet, oral, Daily    fluticasone (Flonase Allergy Relief) 50 mcg/actuation nasal spray 1 spray, Each Nostril, Daily, FOR 30 DAYS     fosinopril (MONOPRIL) 40 mg, oral, Daily    hydroCHLOROthiazide (HYDRODIURIL) 25 mg, oral, Daily before breakfast    metoprolol succinate XL (TOPROL-XL) 50 mg, oral, Daily    multivitamin-Ca-iron-minerals tablet oral, As directed     omeprazole (PRILOSEC) 40 mg, oral, Daily before breakfast    PARoxetine (PAXIL) 30 mg, oral, Daily    potassium chloride CR (Klor-Con M20) 20 mEq ER tablet 20 mEq, oral, 2 times daily (morning and late afternoon)        Allergies  Allergies   Allergen Reactions    Naproxen Unknown    Penicillins Other and Unknown     as a child        Immunizations  Immunization History   Administered Date(s) Administered    DT (pediatric) 01/01/2008    Flu vaccine (IIV4), preservative free *Check age/dose* 11/01/2018, 10/05/2019, 10/28/2021    Flu vaccine, quadrivalent, no egg protein, age 6 month or greater (FLUCELVAX) 10/19/2017    Hepatitis B vaccine, adult *Check Product/Dose* 01/01/2008    Influenza, Seasonal, Quadrivalent, Adjuvanted 10/22/2023    Influenza, Unspecified 09/18/2009    Influenza, injectable, quadrivalent 10/23/2017, 10/29/2018, 11/01/2018, 11/12/2019, 10/01/2020, 10/11/2021    Influenza, seasonal, injectable 10/01/2014, 10/06/2015, 10/17/2016    Novel influenza-H1N1-09, preservative-free 02/23/2010    Pfizer COVID-19 vaccine, 12 years and older, (30mcg/0.3mL) (Comirnaty) 10/04/2023    Pfizer COVID-19  vaccine, bivalent, age 12 years and older (30 mcg/0.3 mL) 2023    Pfizer Gray Cap SARS-CoV-2 2022    Pfizer Purple Cap SARS-CoV-2 2021, 2021, 10/28/2021, 2022    Tdap vaccine, age 7 year and older (BOOSTRIX, ADACEL) 10/01/2008, 2020    Zoster vaccine, recombinant, adult (SHINGRIX) 2023, 2023        Past Surgical History:   Procedure Laterality Date    BREAST BIOPSY  07/15/2013    Biopsy Breast Open     SECTION, CLASSIC  2014     Section    DILATION AND CURETTAGE OF UTERUS  2013    Dilation And Curettage    OTHER SURGICAL HISTORY  07/15/2013    Nasal Endoscopy Polypectomy    TONSILLECTOMY  2013    Tonsillectomy    TUBAL LIGATION  2013    Tubal Ligation     Family History   Problem Relation Name Age of Onset    Other (INFRILTRATING DUCTAL CARCINOMA) Mother          [place] & [place]    Breast cancer Mother  62    Lung cancer Mother      Brain cancer Mother      Heart failure Father      Aortic stenosis Father      Heart disease Father      Other (NIDDM) Brother      Asperger's syndrome Daughter      Breast cancer Maternal Grandmother  58    Ovarian cancer Maternal Grandmother      Breast cancer Paternal Grandmother      Ovarian cancer Other p. cousin 50     Social History     Tobacco Use    Smoking status: Never    Smokeless tobacco: Never   Vaping Use    Vaping status: Never Used   Substance Use Topics    Alcohol use: Yes    Drug use: Never     Tobacco Use: Low Risk  (10/7/2024)    Patient History     Smoking Tobacco Use: Never     Smokeless Tobacco Use: Never     Passive Exposure: Not on file        ROS  All pertinent positive symptoms are included in the history of present illness.  All other systems have been reviewed and are negative and noncontributory to this patient's current ailments.    VITAL SIGNS  Vitals:    10/07/24 1305   BP: 130/82   Pulse: 83   Resp: 16   Temp: 37.4 °C (99.3 °F)   SpO2: 96%     Vitals:    10/07/24 1305    Weight: 83.9 kg (185 lb)      Body mass index is 31.76 kg/m².     PHYSICAL EXAM  GENERAL APPEARANCE: well nourished, well developed, looks like stated age, in no acute distress, not ill or tired appearing, conversing well.   HEENT: no trauma, normocephalic.   NECK: supple without rigidity, no neck mass was observed.   LUNGS: good chest wall expansion. In no acute respiratory distress.   EXTREMITIES: moving all extremities equally with no edema.   SKIN: normal skin color and pigmentation, without rash.   NEUROLOGIC EXAM: CN II-XII grossly intact, normal gait.   PSYCH: mood and affect appropriate; alert and oriented to time, place, person; no difficulty with speech or language.     Counseling:       Medication education:           Education:  The patient is counseled regarding potential side-effects of all new medications          Understanding:  Patient expressed understanding of information conveyed today          Adherence:  No barriers to adherence identified    Preventative Services reviewed with patient and copy printed for patient.    Assessment/Plan   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) D35.2, D35.3    Relevant Orders    Prolactin level     Other Visit Diagnoses         Codes    Medicare annual wellness visit, subsequent    -  Primary Z00.00    Relevant Orders    Comprehensive Metabolic Panel    Lipid Panel    CBC    TSH  with reflex to Free T4 if abnormal    Hemoglobin A1c    Insulin, Fasting    Establishing care with new doctor, encounter for     Z76.89    Breast cancer screening by mammogram     Z12.31    Relevant Orders    BI mammo bilateral screening tomosynthesis    Postmenopausal     Z78.0    Relevant Orders    XR DEXA bone density    Encounter for hepatitis C screening test for low risk patient     Z11.59    Relevant Orders    Hepatitis C Antibody    Class 1 obesity due to excess calories with body mass index (BMI) of 31.0 to 31.9 in adult, unspecified whether serious comorbidity present     E66.811, E66.09, Z68.31    Relevant Orders    Hemoglobin A1c    Insulin, Fasting    Diabetes mellitus screening     Z13.1    Relevant Orders    Hemoglobin A1c    Insulin, Fasting    At high risk for breast cancer     Z91.89    Relevant Orders    Blood Urea Nitrogen    Creatinine    MR breast bilateral w contrast full protocol    Breast cancer screening other than mammogram     Z12.39    Relevant Orders    Blood Urea Nitrogen    Creatinine    MR breast bilateral w contrast full protocol    Family history of breast cancer     Z80.3    Relevant Orders    Blood Urea Nitrogen    Creatinine    MR breast bilateral w contrast full protocol            Additional Visit Plans:  Notes:  PREVENTATIVE HEALTH SCREENINGS INCLUDED:  - Blood pressure screen.  - Blood work may include a cholesterol and diabetes screen if risk factors exist (overweight, high blood pressure etc); screening for sexually transmitted infections; a one time HIV screen for all individuals, and Hepatitis C Virus screening  - I encourage you to eat a low-fat, moderate-carbohydrate, low-calorie diet to maintain a normal BMI (under 25) to reduce heart disease, and risk for diabetes  - Moderate intensity exercise for 30 minutes 5 days per week is recommended  - Helpful resources recommended by the American Academy of Family Practice can be found at www.familydoctor.org or  www.choosemyplate.gov  - Can also consider enrolling in a program such as Weight Watchers or Carina Lewis. The most effective diet is going to be one you can follow long term and turn into a lifestyle. The CDC recommends losing 0.5-2 lbs a week if overweight or obese.  - I recommend a routine vision check and dental cleanings every 6 months    - Colon cancer screening for all ages 45-75 or 85 years old periodically depending on results.  Up-to-date with Ashish, next due 2027  - Cervical cancer screening (pap test) in women between 21-65 years old, periodically depending on results. N/A  - Mammogram screening for breast cancer in women starting at 40-50 years and every 1-2 years. Due in Feb but due for screening Mri now, ordered  - Bone density screening (DEXA) for osteoporosis in women aged 65 years and older, once every two years if needed. Due now, ordered    IMMUNIZATIONS:  - Flu shot annually.  Due at this time, plan to get at pharmacy  - Tetanus booster every 10 years.  Up-to-date, due 2030  - Two pneumococcal vaccinations after 65 years old.  Due at this time, plan to get at pharmacy  - Shingles vaccine for those 50 years or older.  Completed    This was a shared decision making visit.    Blood pressure is controlled. If anything becomes uncontrolled we will see if we can simplify your regimen. For now stay on this.     Mood is well controlled.     Plan to check labs for considering a glp1-agonist. Will have you come back for a dedicated visit to discuss how this works.     Next Wellness Exam Due  In 1 year from today      Ranjit Sarah DO   10/07/24   1:40 PM

## 2024-10-14 ENCOUNTER — LAB (OUTPATIENT)
Dept: LAB | Facility: LAB | Age: 64
End: 2024-10-14
Payer: MEDICARE

## 2024-10-14 DIAGNOSIS — E66.09 CLASS 1 OBESITY DUE TO EXCESS CALORIES WITH BODY MASS INDEX (BMI) OF 31.0 TO 31.9 IN ADULT, UNSPECIFIED WHETHER SERIOUS COMORBIDITY PRESENT: ICD-10-CM

## 2024-10-14 DIAGNOSIS — D35.3 BENIGN NEOPLASM OF PITUITARY GLAND AND CRANIOPHARYNGEAL DUCT (POUCH) (MULTI): ICD-10-CM

## 2024-10-14 DIAGNOSIS — Z11.59 ENCOUNTER FOR HEPATITIS C SCREENING TEST FOR LOW RISK PATIENT: ICD-10-CM

## 2024-10-14 DIAGNOSIS — I10 BENIGN ESSENTIAL HTN: ICD-10-CM

## 2024-10-14 DIAGNOSIS — E66.811 CLASS 1 OBESITY DUE TO EXCESS CALORIES WITH BODY MASS INDEX (BMI) OF 31.0 TO 31.9 IN ADULT, UNSPECIFIED WHETHER SERIOUS COMORBIDITY PRESENT: ICD-10-CM

## 2024-10-14 DIAGNOSIS — D35.2 BENIGN NEOPLASM OF PITUITARY GLAND AND CRANIOPHARYNGEAL DUCT (POUCH) (MULTI): ICD-10-CM

## 2024-10-14 DIAGNOSIS — Z13.1 DIABETES MELLITUS SCREENING: ICD-10-CM

## 2024-10-14 DIAGNOSIS — Z00.00 MEDICARE ANNUAL WELLNESS VISIT, SUBSEQUENT: ICD-10-CM

## 2024-10-14 LAB
ALBUMIN SERPL BCP-MCNC: 4.1 G/DL (ref 3.4–5)
ALP SERPL-CCNC: 53 U/L (ref 33–136)
ALT SERPL W P-5'-P-CCNC: 18 U/L (ref 7–45)
ANION GAP SERPL CALCULATED.3IONS-SCNC: 11 MMOL/L (ref 10–20)
AST SERPL W P-5'-P-CCNC: 20 U/L (ref 9–39)
BILIRUB SERPL-MCNC: 0.6 MG/DL (ref 0–1.2)
BUN SERPL-MCNC: 12 MG/DL (ref 6–23)
CALCIUM SERPL-MCNC: 9.4 MG/DL (ref 8.6–10.3)
CHLORIDE SERPL-SCNC: 104 MMOL/L (ref 98–107)
CHOLEST SERPL-MCNC: 182 MG/DL (ref 0–199)
CHOLEST/HDLC SERPL: 3.1 {RATIO}
CO2 SERPL-SCNC: 28 MMOL/L (ref 21–32)
CREAT SERPL-MCNC: 1.01 MG/DL (ref 0.5–1.05)
EGFRCR SERPLBLD CKD-EPI 2021: 62 ML/MIN/1.73M*2
ERYTHROCYTE [DISTWIDTH] IN BLOOD BY AUTOMATED COUNT: 13.3 % (ref 11.5–14.5)
EST. AVERAGE GLUCOSE BLD GHB EST-MCNC: 85 MG/DL
GLUCOSE SERPL-MCNC: 126 MG/DL (ref 74–99)
HBA1C MFR BLD: 4.6 %
HCT VFR BLD AUTO: 43.1 % (ref 36–46)
HCV AB SER QL: NONREACTIVE
HDLC SERPL-MCNC: 57.8 MG/DL
HGB BLD-MCNC: 13.9 G/DL (ref 12–16)
INSULIN P FAST SERPL-ACNC: 14 UIU/ML (ref 3–25)
LDLC SERPL CALC-MCNC: 100 MG/DL
MCH RBC QN AUTO: 29 PG (ref 26–34)
MCHC RBC AUTO-ENTMCNC: 32.3 G/DL (ref 32–36)
MCV RBC AUTO: 90 FL (ref 80–100)
NON HDL CHOLESTEROL: 124 MG/DL (ref 0–149)
NRBC BLD-RTO: 0 /100 WBCS (ref 0–0)
PLATELET # BLD AUTO: 199 X10*3/UL (ref 150–450)
POTASSIUM SERPL-SCNC: 4.1 MMOL/L (ref 3.5–5.3)
PROLACTIN SERPL-MCNC: 14.9 UG/L (ref 3–20)
PROT SERPL-MCNC: 6.7 G/DL (ref 6.4–8.2)
RBC # BLD AUTO: 4.8 X10*6/UL (ref 4–5.2)
SODIUM SERPL-SCNC: 139 MMOL/L (ref 136–145)
TRIGL SERPL-MCNC: 120 MG/DL (ref 0–149)
TSH SERPL-ACNC: 3.11 MIU/L (ref 0.44–3.98)
VLDL: 24 MG/DL (ref 0–40)
WBC # BLD AUTO: 2.6 X10*3/UL (ref 4.4–11.3)

## 2024-10-14 PROCEDURE — 36415 COLL VENOUS BLD VENIPUNCTURE: CPT

## 2024-10-14 PROCEDURE — 83036 HEMOGLOBIN GLYCOSYLATED A1C: CPT

## 2024-10-14 PROCEDURE — 85027 COMPLETE CBC AUTOMATED: CPT

## 2024-10-14 PROCEDURE — 83525 ASSAY OF INSULIN: CPT

## 2024-10-14 PROCEDURE — 80053 COMPREHEN METABOLIC PANEL: CPT

## 2024-10-14 PROCEDURE — 84146 ASSAY OF PROLACTIN: CPT

## 2024-10-14 PROCEDURE — 86803 HEPATITIS C AB TEST: CPT

## 2024-10-14 PROCEDURE — 80061 LIPID PANEL: CPT

## 2024-10-14 PROCEDURE — 84443 ASSAY THYROID STIM HORMONE: CPT

## 2024-10-18 ENCOUNTER — HOSPITAL ENCOUNTER (OUTPATIENT)
Dept: RADIOLOGY | Facility: CLINIC | Age: 64
Discharge: HOME | End: 2024-10-18
Payer: COMMERCIAL

## 2024-10-18 DIAGNOSIS — Z91.89 AT HIGH RISK FOR BREAST CANCER: ICD-10-CM

## 2024-10-18 DIAGNOSIS — Z80.3 FAMILY HISTORY OF BREAST CANCER: ICD-10-CM

## 2024-10-18 DIAGNOSIS — Z12.39 BREAST CANCER SCREENING OTHER THAN MAMMOGRAM: ICD-10-CM

## 2024-10-18 PROCEDURE — A9575 INJ GADOTERATE MEGLUMI 0.1ML: HCPCS | Performed by: FAMILY MEDICINE

## 2024-10-18 PROCEDURE — 77049 MRI BREAST C-+ W/CAD BI: CPT

## 2024-10-18 PROCEDURE — 2550000001 HC RX 255 CONTRASTS: Performed by: FAMILY MEDICINE

## 2024-10-18 RX ORDER — GADOTERATE MEGLUMINE 376.9 MG/ML
17 INJECTION INTRAVENOUS
Status: COMPLETED | OUTPATIENT
Start: 2024-10-18 | End: 2024-10-18

## 2024-11-04 ENCOUNTER — TELEPHONE (OUTPATIENT)
Dept: PRIMARY CARE | Facility: CLINIC | Age: 64
End: 2024-11-04
Payer: COMMERCIAL

## 2024-11-04 DIAGNOSIS — K21.9 GASTROESOPHAGEAL REFLUX DISEASE, UNSPECIFIED WHETHER ESOPHAGITIS PRESENT: ICD-10-CM

## 2024-11-04 DIAGNOSIS — D72.819 LEUKOPENIA, UNSPECIFIED TYPE: Primary | ICD-10-CM

## 2024-11-04 NOTE — TELEPHONE ENCOUNTER
[facility], Labs, pt aware, pt scheduled 1/3/25 wt loss unless sooner Please advise and call back .    Refill:  omeprazole (PriLOSEC) 40 mg DR capsule Take 1 capsule (40 mg) by mouth once daily in the morning. Take before meals.       Pharm:  [facility]

## 2024-11-05 RX ORDER — OMEPRAZOLE 40 MG/1
40 CAPSULE, DELAYED RELEASE ORAL
Qty: 90 CAPSULE | Refills: 1 | Status: SHIPPED | OUTPATIENT
Start: 2024-11-05 | End: 2025-11-05

## 2025-01-03 ENCOUNTER — OFFICE VISIT (OUTPATIENT)
Dept: PRIMARY CARE | Facility: CLINIC | Age: 65
End: 2025-01-03
Payer: COMMERCIAL

## 2025-01-03 VITALS
WEIGHT: 179 LBS | DIASTOLIC BLOOD PRESSURE: 82 MMHG | RESPIRATION RATE: 16 BRPM | HEART RATE: 73 BPM | TEMPERATURE: 97.8 F | BODY MASS INDEX: 30.73 KG/M2 | OXYGEN SATURATION: 98 % | SYSTOLIC BLOOD PRESSURE: 142 MMHG

## 2025-01-03 DIAGNOSIS — E88.819 INSULIN RESISTANCE: ICD-10-CM

## 2025-01-03 DIAGNOSIS — E66.811 CLASS 1 OBESITY DUE TO EXCESS CALORIES WITH SERIOUS COMORBIDITY AND BODY MASS INDEX (BMI) OF 30.0 TO 30.9 IN ADULT: Primary | ICD-10-CM

## 2025-01-03 DIAGNOSIS — E66.09 CLASS 1 OBESITY DUE TO EXCESS CALORIES WITH SERIOUS COMORBIDITY AND BODY MASS INDEX (BMI) OF 30.0 TO 30.9 IN ADULT: Primary | ICD-10-CM

## 2025-01-03 PROCEDURE — 1159F MED LIST DOCD IN RCRD: CPT | Performed by: FAMILY MEDICINE

## 2025-01-03 PROCEDURE — 99214 OFFICE O/P EST MOD 30 MIN: CPT | Performed by: FAMILY MEDICINE

## 2025-01-03 PROCEDURE — 1126F AMNT PAIN NOTED NONE PRSNT: CPT | Performed by: FAMILY MEDICINE

## 2025-01-03 PROCEDURE — 1123F ACP DISCUSS/DSCN MKR DOCD: CPT | Performed by: FAMILY MEDICINE

## 2025-01-03 PROCEDURE — 3077F SYST BP >= 140 MM HG: CPT | Performed by: FAMILY MEDICINE

## 2025-01-03 PROCEDURE — 1160F RVW MEDS BY RX/DR IN RCRD: CPT | Performed by: FAMILY MEDICINE

## 2025-01-03 PROCEDURE — 3079F DIAST BP 80-89 MM HG: CPT | Performed by: FAMILY MEDICINE

## 2025-01-03 ASSESSMENT — ENCOUNTER SYMPTOMS
OCCASIONAL FEELINGS OF UNSTEADINESS: 0
DEPRESSION: 0
LOSS OF SENSATION IN FEET: 0

## 2025-01-03 ASSESSMENT — PAIN SCALES - GENERAL: PAINLEVEL_OUTOF10: 0-NO PAIN

## 2025-01-03 ASSESSMENT — PATIENT HEALTH QUESTIONNAIRE - PHQ9
2. FEELING DOWN, DEPRESSED OR HOPELESS: NOT AT ALL
SUM OF ALL RESPONSES TO PHQ9 QUESTIONS 1 & 2: 0
1. LITTLE INTEREST OR PLEASURE IN DOING THINGS: NOT AT ALL

## 2025-01-03 ASSESSMENT — COLUMBIA-SUICIDE SEVERITY RATING SCALE - C-SSRS
1. IN THE PAST MONTH, HAVE YOU WISHED YOU WERE DEAD OR WISHED YOU COULD GO TO SLEEP AND NOT WAKE UP?: NO
2. HAVE YOU ACTUALLY HAD ANY THOUGHTS OF KILLING YOURSELF?: NO
6. HAVE YOU EVER DONE ANYTHING, STARTED TO DO ANYTHING, OR PREPARED TO DO ANYTHING TO END YOUR LIFE?: NO

## 2025-01-03 NOTE — PATIENT INSTRUCTIONS
"Problem List Items Addressed This Visit    None  Visit Diagnoses         Codes    Class 1 obesity due to excess calories with serious comorbidity and body mass index (BMI) of 30.0 to 30.9 in adult    -  Primary E66.811, E66.09, Z68.30    Insulin resistance     E88.819            Additional Visit Plans:   - We discussed that a medication we can consider today is Ozempic / Wegovy / Mounjaro / Trulicity / Zepbound. This medication class works on receptors in the brain to reduce appetite and regulate caloric intake. It also slows gastric emptying of your food - making you feel laguerre earlier and for longer. It is a medication that you administer once a week. Possible side effects include heartburn, strain on the pancreas or a racing heart. We will monitor your sugar levels and pancreas numbers while on this. We discussed that rodent studies have shown an increased risk of forming thyroid cancer but the significance of this in humans is undetermined. You have no family history of thyroid or endocrine cancers. Things to look out for in this regard would be a neck mass, chronic coarseness or trouble swallowing. Please follow-up if any of these occur.    - It all depends on how well these are preferred by your insurance and their coverage.    - For women of child bearing age, it is recommended that you consider birth control to prevent pregnancy while on this medication as birth defects are possible.    - Plan to use the clinical pharmacy services to see if we can get you a covered medicine for starting. You can call Donna Pride at [phone]. You can follow-up with me or Donna after 30 days up active treatment to assess your weight loss, tolerability, and for us to increase the dose of your medication if indicated.  Follow-up with me in office after 3 months of treatment so we can recheck blood work and assess her progress    - We discussed eating \"around\" your plate and following a dietary plan with regular " exercise while on this medication.      Patient Care Team:  Ranjit Sarah DO as PCP - General (Family Medicine)  Ranjit Sarah DO as PCP - [facility] PCP    Ranjit Sarah DO   01/03/25   1:31 PM

## 2025-01-03 NOTE — PROGRESS NOTES
Outpatient Visit Note    Chief Complaint   Patient presents with    weight loss med       HPI:  Rae Davis is a 64 y.o. female here to discuss weight loss medication options.    She had blood work done recently with no diabetes, prediabetes but does show a degree of insulin resistance and can benefit from a GLP-1 agonist.    There is no family history of thyroid cancer.  No longer having menstrual cycles.    She has been unable to lose satisfactory weight despite diet and exercise for more than 6 months. Has been trying Weight Watchers.     PHQ9/GAD7:         Patient Active Problem List    Diagnosis Date Noted    Anxiety 09/08/2023    Benign essential HTN 09/08/2023    Benign neoplasm of pituitary gland and craniopharyngeal duct (pouch) (Multi) 09/08/2023    Daytime somnolence 09/08/2023    Degenerative progressive high myopia 09/08/2023    Dermatochalasis of left upper eyelid 09/08/2023    Dermatochalasis of right upper eyelid 09/08/2023    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    Hypertrophy of both inferior nasal turbinates 09/08/2023    Vitamin B12 deficiency 09/08/2023    Snoring 09/08/2023    Sciatica 09/08/2023    Preglaucoma, unspecified, bilateral 09/08/2023    Piriformis syndrome, right 09/08/2023    Panic disorder without agoraphobia 09/08/2023    Obstructive sleep apnea, adult 09/08/2023    Combined forms of age-related cataract of both eyes 09/08/2023    Lumbar radiculopathy 09/08/2023    Hypokalemia 09/08/2023        Past Medical History:   Diagnosis Date    Age-related nuclear cataract of both eyes     Allergy status to unspecified drugs, medicaments and biological substances     History of allergy    Benign neoplasm of pituitary gland (Multi)     Prolactinoma    Chalazion of right upper eyelid     Deficiency of other specified B group vitamins     Vitamin B12 deficiency    Degenerative myopia, bilateral     Fourth nerve palsy of left eye     Ocular pain, right     Personal history of diseases  of the skin and subcutaneous tissue     History of rosacea    Personal history of other diseases of the circulatory system     History of hypertension    Personal history of other diseases of the nervous system and sense organs     History of migraine headaches    Personal history of other diseases of the nervous system and sense organs     History of glaucoma    Preglaucoma, unspecified, bilateral     Radiculopathy, lumbar region 2013    Lumbar radiculopathy    Unspecified disorder of binocular movement         Current Medications  Current Outpatient Medications   Medication Instructions    amLODIPine (NORVASC) 10 mg, oral, Daily    biotin 10 mg tablet 1 tablet, Daily    cholecalciferol (Vitamin D-3) 25 MCG (1000 UT) tablet 1 capsule, Daily    cyanocobalamin (Vitamin B-12) 100 mcg tablet 1 tablet, Daily    fluticasone (Flonase Allergy Relief) 50 mcg/actuation nasal spray 1 spray, Daily    fosinopril (MONOPRIL) 40 mg, oral, Daily    hydroCHLOROthiazide (HYDRODIURIL) 25 mg, oral, Daily before breakfast    metoprolol succinate XL (TOPROL-XL) 50 mg, oral, Daily    multivitamin-Ca-iron-minerals tablet Take by mouth. As directed    omeprazole (PRILOSEC) 40 mg, oral, Daily before breakfast    PARoxetine (PAXIL) 30 mg, oral, Daily    potassium chloride CR (Klor-Con M20) 20 mEq ER tablet 20 mEq, oral, 2 times daily (morning and late afternoon)        Allergies  Allergies   Allergen Reactions    Naproxen Unknown    Penicillins Other and Unknown     as a child        Past Surgical History:   Procedure Laterality Date    BREAST BIOPSY  07/15/2013    Biopsy Breast Open     SECTION, CLASSIC  2014     Section    DILATION AND CURETTAGE OF UTERUS  2013    Dilation And Curettage    OTHER SURGICAL HISTORY  07/15/2013    Nasal Endoscopy Polypectomy    TONSILLECTOMY  2013    Tonsillectomy    TUBAL LIGATION  2013    Tubal Ligation     Family History   Problem Relation Name Age of Onset     Other (INFRILTRATING DUCTAL CARCINOMA) Mother          ER & [place]    Breast cancer Mother  62    Lung cancer Mother      Brain cancer Mother      Heart failure Father      Aortic stenosis Father      Heart disease Father      Other (NIDDM) Brother      Asperger's syndrome Daughter      Breast cancer Maternal Grandmother  58    Ovarian cancer Maternal Grandmother      Breast cancer Paternal Grandmother      Ovarian cancer Other p. cousin 50     Social History     Tobacco Use    Smoking status: Never    Smokeless tobacco: Never   Vaping Use    Vaping status: Never Used   Substance Use Topics    Alcohol use: Yes     Alcohol/week: 4.0 standard drinks of alcohol     Types: 4 Glasses of wine per week     Comment: Depends on occasion    Drug use: Never     Tobacco Use: Low Risk  (1/3/2025)    Patient History     Smoking Tobacco Use: Never     Smokeless Tobacco Use: Never     Passive Exposure: Not on file        ROS  All pertinent positive symptoms are included in the history of present illness.  All other systems have been reviewed and are negative and noncontributory to this patient's current ailments.    VITAL SIGNS  Vitals:    01/03/25 1329   BP: 142/82   Pulse: 73   Resp: 16   Temp: 36.6 °C (97.8 °F)   SpO2: 98%     Vitals:    01/03/25 1329   Weight: 81.2 kg (179 lb)      Body mass index is 30.73 kg/m².     PHYSICAL EXAM  GENERAL APPEARANCE: well nourished, well developed, looks like stated age, in no acute distress, not ill or tired appearing, conversing well.   HEENT: no trauma, normocephalic.   NECK: supple without rigidity, no neck mass was observed.   LUNGS: good chest wall expansion. In no acute respiratory distress.   EXTREMITIES: moving all extremities equally with no edema.   SKIN: normal skin color and pigmentation, without rash.   NEUROLOGIC EXAM: CN II-XII grossly intact, normal gait.   PSYCH: mood and affect appropriate; alert and oriented to time, place, person; no difficulty with speech or language.      Counseling:       Medication education:           Education:  The patient is counseled regarding potential side-effects of all new medications          Understanding:  Patient expressed understanding of information conveyed today          Adherence:  No barriers to adherence identified     Assessment/Plan   Problem List Items Addressed This Visit    None  Visit Diagnoses         Codes    Class 1 obesity due to excess calories with serious comorbidity and body mass index (BMI) of 30.0 to 30.9 in adult    -  Primary E66.811, E66.09, Z68.30    Insulin resistance     E88.819            Additional Visit Plans:   - We discussed that a medication we can consider today is Ozempic / Wegovy / Mounjaro / Trulicity / Zepbound. This medication class works on receptors in the brain to reduce appetite and regulate caloric intake. It also slows gastric emptying of your food - making you feel laguerre earlier and for longer. It is a medication that you administer once a week. Possible side effects include heartburn, strain on the pancreas or a racing heart. We will monitor your sugar levels and pancreas numbers while on this. We discussed that rodent studies have shown an increased risk of forming thyroid cancer but the significance of this in humans is undetermined. You have no family history of thyroid or endocrine cancers. Things to look out for in this regard would be a neck mass, chronic coarseness or trouble swallowing. Please follow-up if any of these occur.    - It all depends on how well these are preferred by your insurance and their coverage.    - For women of child bearing age, it is recommended that you consider birth control to prevent pregnancy while on this medication as birth defects are possible.    - Plan to use the clinical pharmacy services to see if we can get you a covered medicine for starting. You can call Donna Pride at [phone]. You can follow-up with me or Donna after 30 days up active  "treatment to assess your weight loss, tolerability, and for us to increase the dose of your medication if indicated.  Follow-up with me in office after 3 months of treatment so we can recheck blood work and assess her progress    - We discussed eating \"around\" your plate and following a dietary plan with regular exercise while on this medication.      Patient Care Team:  Ranjit Sarah DO as PCP - General (Family Medicine)  Ranjit Sarah DO as PCP - MMO [facility] PCP    Ranjit Sarah DO   01/03/25   1:31 PM   "

## 2025-01-10 ENCOUNTER — LAB (OUTPATIENT)
Dept: LAB | Facility: LAB | Age: 65
End: 2025-01-10
Payer: COMMERCIAL

## 2025-01-10 DIAGNOSIS — D72.819 LEUKOPENIA, UNSPECIFIED TYPE: ICD-10-CM

## 2025-01-10 LAB
ERYTHROCYTE [DISTWIDTH] IN BLOOD BY AUTOMATED COUNT: 13.6 % (ref 11.5–14.5)
HCT VFR BLD AUTO: 41.8 % (ref 36–46)
HGB BLD-MCNC: 14.2 G/DL (ref 12–16)
MCH RBC QN AUTO: 32.1 PG (ref 26–34)
MCHC RBC AUTO-ENTMCNC: 34 G/DL (ref 32–36)
MCV RBC AUTO: 94 FL (ref 80–100)
NRBC BLD-RTO: 0 /100 WBCS (ref 0–0)
PLATELET # BLD AUTO: 255 X10*3/UL (ref 150–450)
RBC # BLD AUTO: 4.43 X10*6/UL (ref 4–5.2)
WBC # BLD AUTO: 5 X10*3/UL (ref 4.4–11.3)

## 2025-01-10 PROCEDURE — 85027 COMPLETE CBC AUTOMATED: CPT

## 2025-01-15 ENCOUNTER — APPOINTMENT (OUTPATIENT)
Dept: PHARMACY | Facility: HOSPITAL | Age: 65
End: 2025-01-15
Payer: COMMERCIAL

## 2025-01-15 DIAGNOSIS — E88.819 INSULIN RESISTANCE: ICD-10-CM

## 2025-01-15 DIAGNOSIS — E66.09 CLASS 1 OBESITY DUE TO EXCESS CALORIES WITH SERIOUS COMORBIDITY AND BODY MASS INDEX (BMI) OF 30.0 TO 30.9 IN ADULT: ICD-10-CM

## 2025-01-15 DIAGNOSIS — E66.811 CLASS 1 OBESITY DUE TO EXCESS CALORIES WITH SERIOUS COMORBIDITY AND BODY MASS INDEX (BMI) OF 30.0 TO 30.9 IN ADULT: ICD-10-CM

## 2025-01-15 NOTE — PROGRESS NOTES
Clinical Pharmacy Appointment    Patient ID: Rae Davis is a 64 y.o. female who presents for Obesity.    Pt is here for First appointment.     Referring Provider/PCP: Ranjit Sarah DO  Last visit with PCP: 1/3/25   Next visit with PCP: 3/14/25      Subjective   HPI  WEIGHT LOSS  BMI Readings from Last 3 Encounters:   01/03/25 30.73 kg/m²   10/18/24 30.90 kg/m²   10/07/24 31.76 kg/m²      Starting weight: 179 lbs. (1/3)  Current weight: 179 lbs.    Lifestyle  Diet: unknown meals/day.  BK: unknown  LN: unknown  DN: unknown  Snacks: unknown  Drinks: unknown  Has worked with nutritionist/dietician? unknown  Physical Activity: Works out    Other Potential Contributing Factors  Alcohol use: Average unknown drinks/week, cut back  Tobacco use: No  Other drug use: No  Medications that may cause weight gain: none  Mental health: No current concerns  Eating Disorders: Denies Hx of any eating disorder  Comorbidities: Comorbidities: sleep apnea not using an appliance  and hypertension controlled with oral meds      Non-Pharmacological Therapy  Weight loss techniques attempted:  Self-directed dieting: weight watchers    Pharmacological Therapy  Current Medications: None  Adverse Effects: N/A  Previous Medications: None     Insurance coverage of weight-loss medications? No,    Eligible for copay cards/programs? Yes,    Eligible for  PAP? No, reports income >400% FPL    Medication Estimated Cost Expected weight loss Patient Risks/Cautions Notes   Wegovy (semaglutide) ~$650/month w/ savings card 10-20% None      Zepbound (tirzepatide) $650/month   w/ savings card 10-20%     Qsymia (phentermine-topiramate) $98/month via Qsymia Eng[employer] 5-10% None Controlled substance   Contrave (bupropion-naltrexone) $99/month   via [employer] 5-10% None    Adipex (phentermine) ~$15/month 3-5% None Controlled substance,  Short-term use only   Genaro (OTC Orlistat) ~$60/month 3-5%  Adverse effects likely outweigh benefit.         Medication  Reconciliation:  Changed:   Added:   Discontinued:     Drug Interactions  No relevant drug interactions were noted.    Medication System Management  [facility]  Adherence/Organization: No issues reported  Affordability/Accessibility: Weight loss medications not covered      Objective   Allergies   Allergen Reactions    Naproxen Unknown    Penicillins Other and Unknown     as a child     Social History     Social History Narrative    Not on file      Medication Review  Current Outpatient Medications   Medication Instructions    amLODIPine (NORVASC) 10 mg, oral, Daily    biotin 10 mg tablet 1 tablet, Daily    cholecalciferol (Vitamin D-3) 25 MCG (1000 UT) tablet 1 capsule, Daily    cyanocobalamin (Vitamin B-12) 100 mcg tablet 1 tablet, Daily    fluticasone (Flonase Allergy Relief) 50 mcg/actuation nasal spray 1 spray, Daily    fosinopril (MONOPRIL) 40 mg, oral, Daily    hydroCHLOROthiazide (HYDRODIURIL) 25 mg, oral, Daily before breakfast    metoprolol succinate XL (TOPROL-XL) 50 mg, oral, Daily    multivitamin-Ca-iron-minerals tablet Take by mouth. As directed    omeprazole (PRILOSEC) 40 mg, oral, Daily before breakfast    PARoxetine (PAXIL) 30 mg, oral, Daily    potassium chloride CR (Klor-Con M20) 20 mEq ER tablet 20 mEq, oral, 2 times daily (morning and late afternoon)      Vitals  BP Readings from Last 2 Encounters:   01/03/25 142/82   10/07/24 130/82     BMI Readings from Last 1 Encounters:   01/03/25 30.73 kg/m²      Labs  A1C  Lab Results   Component Value Date    HGBA1C 4.6 10/14/2024     BMP  Lab Results   Component Value Date    CALCIUM 9.4 10/14/2024     10/14/2024    K 4.1 10/14/2024    CO2 28 10/14/2024     10/14/2024    BUN 12 10/14/2024    CREATININE 1.01 10/14/2024    EGFR 62 10/14/2024     LFTs  Lab Results   Component Value Date    ALT 18 10/14/2024    AST 20 10/14/2024    ALKPHOS 53 10/14/2024    BILITOT 0.6 10/14/2024     FLP  Lab Results   Component  "Value Date    TRIG 120 10/14/2024    CHOL 182 10/14/2024    LDLCALC 100 (H) 10/14/2024    HDL 57.8 10/14/2024     Urine Microalbumin  No results found for: \"MICROALBCREA\"  Weight Management  Wt Readings from Last 3 Encounters:   01/03/25 81.2 kg (179 lb)   10/18/24 81.6 kg (180 lb)   10/07/24 83.9 kg (185 lb)      There is no height or weight on file to calculate BMI.     Assessment/Plan   Problem List Items Addressed This Visit       Class 1 obesity due to excess calories with serious comorbidity and body mass index (BMI) of 30.0 to 30.9 in adult     Current regimen includes none. Patient's current weight reported as 179. Has lost 0 lbs (0% of TBW) since starting therapy plan. Due to insurance not covering medications, plan to continue lifestyle changes.    Medication Changes:  CONTINUE  Lifestyle changes            Other Visit Diagnoses       Insulin resistance                Clinical Pharmacist follow-up: as needed, Telehealth visit    Continue all meds under the continuation of care with the referring provider and clinical pharmacy team.    Thank you,  Donna Pride, PharmD, [facility]  Clinical Pharmacy Specialist  [phone]    Verbal consent to manage patient's drug therapy was obtained from the patient. They were informed they may decline to participate or withdraw from participation in pharmacy services at any time.   "

## 2025-01-15 NOTE — ASSESSMENT & PLAN NOTE
Current regimen includes none. Patient's current weight reported as 179. Has lost 0 lbs (0% of TBW) since starting therapy plan. Due to insurance not covering medications, plan to continue lifestyle changes.    Medication Changes:  CONTINUE  Lifestyle changes

## 2025-01-15 NOTE — Clinical Note
Patients insurace does not cover weight loss medications, and patient reports income too high for copay assistance. Let her know that with Zepbound recently approved for sleep apnea her insurance may start covering it in the future, or if she switches to medicare.

## 2025-02-03 ENCOUNTER — HOSPITAL ENCOUNTER (OUTPATIENT)
Dept: RADIOLOGY | Facility: CLINIC | Age: 65
Discharge: HOME | End: 2025-02-03
Payer: COMMERCIAL

## 2025-02-03 VITALS — HEIGHT: 64 IN | WEIGHT: 170 LBS | BODY MASS INDEX: 29.02 KG/M2

## 2025-02-03 DIAGNOSIS — Z12.31 BREAST CANCER SCREENING BY MAMMOGRAM: ICD-10-CM

## 2025-02-03 PROCEDURE — 77067 SCR MAMMO BI INCL CAD: CPT

## 2025-02-03 PROCEDURE — 77067 SCR MAMMO BI INCL CAD: CPT | Performed by: RADIOLOGY

## 2025-02-03 PROCEDURE — 77063 BREAST TOMOSYNTHESIS BI: CPT | Performed by: RADIOLOGY

## 2025-02-12 PROCEDURE — 88305 TISSUE EXAM BY PATHOLOGIST: CPT

## 2025-02-13 ENCOUNTER — LAB REQUISITION (OUTPATIENT)
Dept: LAB | Facility: HOSPITAL | Age: 65
End: 2025-02-13
Payer: COMMERCIAL

## 2025-02-13 DIAGNOSIS — K06.010: ICD-10-CM

## 2025-02-18 LAB
LABORATORY COMMENT REPORT: NORMAL
PATH REPORT.FINAL DX SPEC: NORMAL
PATH REPORT.GROSS SPEC: NORMAL
PATH REPORT.RELEVANT HX SPEC: NORMAL
PATH REPORT.TOTAL CANCER: NORMAL

## 2025-02-25 ENCOUNTER — APPOINTMENT (OUTPATIENT)
Dept: OPHTHALMOLOGY | Facility: CLINIC | Age: 65
End: 2025-02-25
Payer: COMMERCIAL

## 2025-02-25 DIAGNOSIS — H49.12 LEFT TROCHLEAR NERVE PALSY: ICD-10-CM

## 2025-02-25 DIAGNOSIS — H44.23 BILATERAL DEGENERATIVE PROGRESSIVE HIGH MYOPIA: ICD-10-CM

## 2025-02-25 DIAGNOSIS — H40.003 PREGLAUCOMA OF BOTH EYES: Primary | ICD-10-CM

## 2025-02-25 DIAGNOSIS — H25.813 COMBINED FORMS OF AGE-RELATED CATARACT OF BOTH EYES: ICD-10-CM

## 2025-02-25 DIAGNOSIS — H02.834 DERMATOCHALASIS OF BOTH UPPER EYELIDS: ICD-10-CM

## 2025-02-25 DIAGNOSIS — H02.831 DERMATOCHALASIS OF BOTH UPPER EYELIDS: ICD-10-CM

## 2025-02-25 PROCEDURE — 99214 OFFICE O/P EST MOD 30 MIN: CPT | Performed by: OPHTHALMOLOGY

## 2025-02-25 PROCEDURE — 92133 CPTRZD OPH DX IMG PST SGM ON: CPT | Performed by: OPHTHALMOLOGY

## 2025-02-25 ASSESSMENT — REFRACTION_WEARINGRX
OD_ADD: +2.50
OS_ADD: +2.50
OD_CYLINDER: -1.25
SPECS_TYPE: PAL
OS_CYLINDER: -2.25
OS_AXIS: 073
OS_SPHERE: -4.50
OD_AXIS: 114
OD_SPHERE: -5.50

## 2025-02-25 ASSESSMENT — ENCOUNTER SYMPTOMS
ENDOCRINE NEGATIVE: 0
PSYCHIATRIC NEGATIVE: 0
CARDIOVASCULAR NEGATIVE: 0
MUSCULOSKELETAL NEGATIVE: 0
NEUROLOGICAL NEGATIVE: 0
CONSTITUTIONAL NEGATIVE: 0
EYES NEGATIVE: 0
ALLERGIC/IMMUNOLOGIC NEGATIVE: 0
RESPIRATORY NEGATIVE: 0
HEMATOLOGIC/LYMPHATIC NEGATIVE: 0
GASTROINTESTINAL NEGATIVE: 0

## 2025-02-25 ASSESSMENT — TONOMETRY
IOP_METHOD: GOLDMANN APPLANATION
OD_IOP_MMHG: 17
OS_IOP_MMHG: 17

## 2025-02-25 ASSESSMENT — CUP TO DISC RATIO
OS_RATIO: 0.5
OD_RATIO: 0.6

## 2025-02-25 ASSESSMENT — VISUAL ACUITY
CORRECTION_TYPE: GLASSES
OS_CC+: -2
OD_CC: 20/30
METHOD: SNELLEN - SINGLE
OD_CC+: -1
OS_CC: 20/25

## 2025-02-25 ASSESSMENT — PACHYMETRY
OD_CT(UM): 615
OS_CT(UM): 615

## 2025-02-25 ASSESSMENT — PAIN SCALES - GENERAL: PAINLEVEL_OUTOF10: 0-NO PAIN

## 2025-02-25 ASSESSMENT — PATIENT HEALTH QUESTIONNAIRE - PHQ9
SUM OF ALL RESPONSES TO PHQ9 QUESTIONS 1 AND 2: 0
1. LITTLE INTEREST OR PLEASURE IN DOING THINGS: NOT AT ALL
2. FEELING DOWN, DEPRESSED OR HOPELESS: NOT AT ALL

## 2025-02-25 ASSESSMENT — EXTERNAL EXAM - LEFT EYE: OS_EXAM: NORMAL

## 2025-02-25 ASSESSMENT — EXTERNAL EXAM - RIGHT EYE: OD_EXAM: NORMAL

## 2025-02-25 NOTE — PROGRESS NOTES
Subjective   Patient ID: Rae Davis is a 65 y.o. female.    Chief Complaint    Annual Exam       HPI    Seeing optometrist for glassed and soft contact lens (SCL) Rx. Foreign body sensation OD for past week. Using art tears and eyewash. Only feeling foreign body (FB) sensation when putting in soft contact lens (SCL).    Complete exam.  No changes in health history or meds.  Vision is good and stable.  No new problems or complaints.    Last edited by Tay Sanderson MD on 2/25/2025  4:09 PM.        No current outpatient medications on file. (Ophthalmology pharm classes)       Current Outpatient Medications (Other)   Medication Sig Dispense Refill    amLODIPine (Norvasc) 10 mg tablet Take 1 tablet (10 mg) by mouth once daily. 90 tablet 1    biotin 10 mg tablet Take 1 tablet (10 mg) by mouth once daily. FOR 30 DAYS      cholecalciferol (Vitamin D-3) 25 MCG (1000 UT) tablet Take 1 capsule by mouth once daily.      cyanocobalamin (Vitamin B-12) 100 mcg tablet Take 1 tablet (100 mcg) by mouth once daily.      fluticasone (Flonase Allergy Relief) 50 mcg/actuation nasal spray Administer 1 spray into each nostril once daily. FOR 30 DAYS      fosinopril (Monopril) 40 mg tablet Take 1 tablet (40 mg) by mouth once daily. 90 tablet 1    hydroCHLOROthiazide (HYDRODiuril) 25 mg tablet Take 1 tablet (25 mg) by mouth once daily in the morning. Take before meals. 90 tablet 0    metoprolol succinate XL (Toprol-XL) 50 mg 24 hr tablet Take 1 tablet (50 mg) by mouth once daily. 90 tablet 1    multivitamin-Ca-iron-minerals tablet Take by mouth. As directed      omeprazole (PriLOSEC) 40 mg DR capsule Take 1 capsule (40 mg) by mouth once daily in the morning. Take before meals. 90 capsule 1    PARoxetine (Paxil) 10 mg tablet Take 3 tablets (30 mg) by mouth once daily. 270 tablet 1    potassium chloride CR (Klor-Con M20) 20 mEq ER tablet Take 1 tablet (20 mEq) by mouth 2 times daily (morning and late afternoon). 180 tablet 1        Objective   Base Eye Exam       Visual Acuity (Snellen - Single)         Right Left Both    Dist cc 20/30 -1 20/25 -2     Near cc   J2      Correction: Glasses              Tonometry (Goldmann Applanation, 3:34 PM)         Right Left    Pressure 17 17              Pupils         Dark Shape React APD    Right 4 Round 1 None    Left 4 Round 1 None              Extraocular Movement         Right Left     Full Full              Dilation       Both eyes: 1% Tropic 2.5% Phen @ 3:33 PM                  Slit Lamp and Fundus Exam       External Exam         Right Left    External Normal Normal              Slit Lamp Exam         Right Left    Lids/Lashes 1+ Blepharitis, 1+ Dermatochalasis - upper lid 1+ Blepharitis, 1+ Dermatochalasis - upper lid    Conjunctiva/Sclera White and quiet White and quiet    Cornea Clear Clear    Anterior Chamber Deep and quiet Deep and quiet    Iris pupil miotic pupil miotic    Lens 1+ Nuclear sclerosis, 1+ Cortical cataract 1+ Nuclear sclerosis, 1+ Cortical cataract    Anterior Vitreous vitreous syneresis vitreous syneresis              Fundus Exam         Right Left    Disc sloping rim sloping rim    C/D Ratio 0.6 0.5    Macula normal macula normal macula    Vessels normal retinal vessels normal retinal vessels    Periphery normal retinal periphery normal retinal periphery                  Refraction       Wearing Rx         Sphere Cylinder Axis Add    Right -5.50 -1.25 114 +2.50    Left -4.50 -2.25 073 +2.50      Type: PAL                    Assessment/Plan   Problem List Items Addressed This Visit          Eye/Vision problems    Degenerative progressive high myopia    Dermatochalasis of both upper eyelids    Fourth nerve palsy    Preglaucoma of both eyes - Primary     F/u one year full with oct nerves.           Relevant Orders    OCT, Optic Nerve - OU - Both Eyes (Completed)    Combined forms of age-related cataract of both eyes

## 2025-02-28 ENCOUNTER — APPOINTMENT (OUTPATIENT)
Dept: OPHTHALMOLOGY | Facility: CLINIC | Age: 65
End: 2025-02-28
Payer: COMMERCIAL

## 2025-03-05 DIAGNOSIS — I10 BENIGN ESSENTIAL HTN: ICD-10-CM

## 2025-03-07 RX ORDER — HYDROCHLOROTHIAZIDE 25 MG/1
25 TABLET ORAL
Qty: 90 TABLET | Refills: 1 | Status: SHIPPED | OUTPATIENT
Start: 2025-03-07

## 2025-03-14 ENCOUNTER — APPOINTMENT (OUTPATIENT)
Dept: PRIMARY CARE | Facility: CLINIC | Age: 65
End: 2025-03-14
Payer: COMMERCIAL

## 2025-04-14 DIAGNOSIS — F41.9 ANXIETY: ICD-10-CM

## 2025-04-14 DIAGNOSIS — I10 BENIGN ESSENTIAL HTN: ICD-10-CM

## 2025-04-14 RX ORDER — METOPROLOL SUCCINATE 50 MG/1
50 TABLET, EXTENDED RELEASE ORAL DAILY
Qty: 90 TABLET | Refills: 0 | Status: SHIPPED | OUTPATIENT
Start: 2025-04-14

## 2025-04-14 RX ORDER — PAROXETINE 10 MG/1
30 TABLET, FILM COATED ORAL DAILY
Qty: 270 TABLET | Refills: 0 | Status: SHIPPED | OUTPATIENT
Start: 2025-04-14

## 2025-05-02 DIAGNOSIS — K21.9 GASTROESOPHAGEAL REFLUX DISEASE, UNSPECIFIED WHETHER ESOPHAGITIS PRESENT: ICD-10-CM

## 2025-05-02 DIAGNOSIS — I10 BENIGN ESSENTIAL HTN: ICD-10-CM

## 2025-05-03 RX ORDER — FOSINOPRIL SODIUM 40 MG/1
40 TABLET ORAL DAILY
Qty: 90 TABLET | Refills: 0 | Status: SHIPPED | OUTPATIENT
Start: 2025-05-03

## 2025-05-03 RX ORDER — OMEPRAZOLE 40 MG/1
40 CAPSULE, DELAYED RELEASE ORAL
Qty: 90 CAPSULE | Refills: 0 | Status: SHIPPED | OUTPATIENT
Start: 2025-05-03 | End: 2025-08-01

## 2025-05-03 RX ORDER — AMLODIPINE BESYLATE 10 MG/1
10 TABLET ORAL DAILY
Qty: 90 TABLET | Refills: 0 | Status: SHIPPED | OUTPATIENT
Start: 2025-05-03

## 2025-06-26 ENCOUNTER — OFFICE VISIT (OUTPATIENT)
Dept: PRIMARY CARE | Facility: CLINIC | Age: 65
End: 2025-06-26
Payer: MEDICARE

## 2025-06-26 VITALS
HEIGHT: 64 IN | TEMPERATURE: 98.1 F | HEART RATE: 62 BPM | DIASTOLIC BLOOD PRESSURE: 78 MMHG | WEIGHT: 166 LBS | RESPIRATION RATE: 18 BRPM | OXYGEN SATURATION: 97 % | SYSTOLIC BLOOD PRESSURE: 130 MMHG | BODY MASS INDEX: 28.34 KG/M2

## 2025-06-26 DIAGNOSIS — R92.2 INCONCLUSIVE MAMMOGRAM: ICD-10-CM

## 2025-06-26 DIAGNOSIS — Z80.3 FAMILY HISTORY OF BREAST CANCER: ICD-10-CM

## 2025-06-26 DIAGNOSIS — I10 BENIGN ESSENTIAL HTN: Primary | ICD-10-CM

## 2025-06-26 DIAGNOSIS — Z91.89 AT HIGH RISK FOR BREAST CANCER: ICD-10-CM

## 2025-06-26 DIAGNOSIS — Z12.39 BREAST CANCER SCREENING OTHER THAN MAMMOGRAM: ICD-10-CM

## 2025-06-26 PROBLEM — D35.3 BENIGN NEOPLASM OF PITUITARY GLAND AND CRANIOPHARYNGEAL DUCT (POUCH) (MULTI): Status: RESOLVED | Noted: 2023-09-08 | Resolved: 2025-06-26

## 2025-06-26 PROBLEM — D35.2 BENIGN NEOPLASM OF PITUITARY GLAND AND CRANIOPHARYNGEAL DUCT (POUCH) (MULTI): Status: RESOLVED | Noted: 2023-09-08 | Resolved: 2025-06-26

## 2025-06-26 PROBLEM — E66.811 CLASS 1 OBESITY DUE TO EXCESS CALORIES WITH SERIOUS COMORBIDITY AND BODY MASS INDEX (BMI) OF 30.0 TO 30.9 IN ADULT: Status: RESOLVED | Noted: 2025-01-15 | Resolved: 2025-06-26

## 2025-06-26 PROBLEM — R06.83 SNORING: Status: RESOLVED | Noted: 2023-09-08 | Resolved: 2025-06-26

## 2025-06-26 PROBLEM — E66.09 CLASS 1 OBESITY DUE TO EXCESS CALORIES WITH SERIOUS COMORBIDITY AND BODY MASS INDEX (BMI) OF 30.0 TO 30.9 IN ADULT: Status: RESOLVED | Noted: 2025-01-15 | Resolved: 2025-06-26

## 2025-06-26 PROCEDURE — 3075F SYST BP GE 130 - 139MM HG: CPT | Performed by: NURSE PRACTITIONER

## 2025-06-26 PROCEDURE — 1160F RVW MEDS BY RX/DR IN RCRD: CPT | Performed by: NURSE PRACTITIONER

## 2025-06-26 PROCEDURE — 1159F MED LIST DOCD IN RCRD: CPT | Performed by: NURSE PRACTITIONER

## 2025-06-26 PROCEDURE — G2211 COMPLEX E/M VISIT ADD ON: HCPCS | Performed by: NURSE PRACTITIONER

## 2025-06-26 PROCEDURE — 99214 OFFICE O/P EST MOD 30 MIN: CPT | Performed by: NURSE PRACTITIONER

## 2025-06-26 PROCEDURE — 1124F ACP DISCUSS-NO DSCNMKR DOCD: CPT | Performed by: NURSE PRACTITIONER

## 2025-06-26 PROCEDURE — 3008F BODY MASS INDEX DOCD: CPT | Performed by: NURSE PRACTITIONER

## 2025-06-26 PROCEDURE — 3078F DIAST BP <80 MM HG: CPT | Performed by: NURSE PRACTITIONER

## 2025-06-26 PROCEDURE — 1036F TOBACCO NON-USER: CPT | Performed by: NURSE PRACTITIONER

## 2025-06-26 PROCEDURE — 1126F AMNT PAIN NOTED NONE PRSNT: CPT | Performed by: NURSE PRACTITIONER

## 2025-06-26 RX ORDER — POTASSIUM CHLORIDE 20 MEQ/1
20 TABLET, EXTENDED RELEASE ORAL
Qty: 180 TABLET | Refills: 1 | Status: SHIPPED | OUTPATIENT
Start: 2025-06-26

## 2025-06-26 ASSESSMENT — PATIENT HEALTH QUESTIONNAIRE - PHQ9
1. LITTLE INTEREST OR PLEASURE IN DOING THINGS: NOT AT ALL
2. FEELING DOWN, DEPRESSED OR HOPELESS: NOT AT ALL
SUM OF ALL RESPONSES TO PHQ9 QUESTIONS 1 AND 2: 0

## 2025-06-26 ASSESSMENT — ENCOUNTER SYMPTOMS
MUSCULOSKELETAL NEGATIVE: 1
GASTROINTESTINAL NEGATIVE: 1
CARDIOVASCULAR NEGATIVE: 1
RESPIRATORY NEGATIVE: 1
CONSTITUTIONAL NEGATIVE: 1

## 2025-06-26 ASSESSMENT — LIFESTYLE VARIABLES
HAVE YOU OR SOMEONE ELSE BEEN INJURED AS A RESULT OF YOUR DRINKING: NO
SKIP TO QUESTIONS 9-10: 1
HOW OFTEN DO YOU HAVE A DRINK CONTAINING ALCOHOL: 2-3 TIMES A WEEK
HOW OFTEN DURING THE LAST YEAR HAVE YOU NEEDED AN ALCOHOLIC DRINK FIRST THING IN THE MORNING TO GET YOURSELF GOING AFTER A NIGHT OF HEAVY DRINKING: NEVER
HOW MANY STANDARD DRINKS CONTAINING ALCOHOL DO YOU HAVE ON A TYPICAL DAY: 1 OR 2
HOW OFTEN DURING THE LAST YEAR HAVE YOU HAD A FEELING OF GUILT OR REMORSE AFTER DRINKING: NEVER
HOW OFTEN DURING THE LAST YEAR HAVE YOU FOUND THAT YOU WERE NOT ABLE TO STOP DRINKING ONCE YOU HAD STARTED: NEVER
HAS A RELATIVE, FRIEND, DOCTOR, OR ANOTHER HEALTH PROFESSIONAL EXPRESSED CONCERN ABOUT YOUR DRINKING OR SUGGESTED YOU CUT DOWN: NO
AUDIT TOTAL SCORE: 3
HOW OFTEN DURING THE LAST YEAR HAVE YOU FAILED TO DO WHAT WAS NORMALLY EXPECTED FROM YOU BECAUSE OF DRINKING: NEVER
HOW OFTEN DURING THE LAST YEAR HAVE YOU BEEN UNABLE TO REMEMBER WHAT HAPPENED THE NIGHT BEFORE BECAUSE YOU HAD BEEN DRINKING: NEVER
AUDIT-C TOTAL SCORE: 3
HOW OFTEN DO YOU HAVE SIX OR MORE DRINKS ON ONE OCCASION: NEVER

## 2025-06-26 ASSESSMENT — PAIN SCALES - GENERAL: PAINLEVEL_OUTOF10: 0-NO PAIN

## 2025-06-26 NOTE — PROGRESS NOTES
"Chief Complaint  Rae Davis is a 65 y.o. female presenting for \"Med Refill (Pt has been having a hard time finding a steady pcp since dr Lundberg /Needs refills, due for medicare wellness in OCT).\"    Med Refill         Rae Davis is a 65 y.o. female presenting to [facility] care has had multiple providers in the last couple years that have left practice she is up-to-date on her mammogram her Cologuard was done in March 2024 she had an EGD done in November 2023 she is on blood pressure medicine  amlodipine, hydrochlorothiazide with Monopril, potassium supplementation and metoprolol succinate.  She does not see cardiology.  Denies any headache, blurry vision, nosebleeds, chest pain, shortness of breath, dizziness or lower extremity edema.  No concerning side effects. Does not measure BPs at home. Makes her too nervous.  well-controlled takes paroxetine for her anxiety doing well with that needs a refill on potassium not due for a Medicare wellness visit yet      Past Medical History  Patient Active Problem List    Diagnosis Date Noted    Anxiety 09/08/2023    Benign essential HTN 09/08/2023    Daytime somnolence 09/08/2023    Degenerative progressive high myopia 09/08/2023    Dermatochalasis of both upper eyelids 09/08/2023    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    Hypertrophy of both inferior nasal turbinates 09/08/2023    Vitamin B12 deficiency 09/08/2023    Sciatica 09/08/2023    Preglaucoma of both eyes 09/08/2023    Piriformis syndrome, right 09/08/2023    Panic disorder without agoraphobia 09/08/2023    Obstructive sleep apnea, adult 09/08/2023    Combined forms of age-related cataract of both eyes 09/08/2023    Lumbar radiculopathy 09/08/2023    Hypokalemia 09/08/2023        Medications  Current Outpatient Medications   Medication Instructions    amLODIPine (NORVASC) 10 mg, oral, Daily    biotin 10 mg tablet 1 tablet, Daily    cholecalciferol (Vitamin D-3) 25 MCG (1000 UT) tablet 1 capsule, Daily    cyanocobalamin " (Vitamin B-12) 100 mcg tablet 1 tablet, Daily    fluticasone (Flonase Allergy Relief) 50 mcg/actuation nasal spray 1 spray, Daily    fosinopril (MONOPRIL) 40 mg, oral, Daily    hydroCHLOROthiazide (HYDRODIURIL) 25 mg, oral, Daily before breakfast    iron bis-glycinat/vit C/FA/B12 (GENTLE IRON ORAL) Taking 18 mg tab    metoprolol succinate XL (TOPROL-XL) 50 mg, oral, Daily    multivitamin-Ca-iron-minerals tablet Take by mouth. As directed    omeprazole (PRILOSEC) 40 mg, oral, Daily before breakfast    PARoxetine (PAXIL) 30 mg, oral, Daily    potassium chloride CR (Klor-Con M20) 20 mEq ER tablet 20 mEq, oral, 2 times daily (morning and late afternoon)        Surgical History  She has a past surgical history that includes Dilation and curettage of uterus (2013); Tonsillectomy (2013); Tubal ligation (2013);  section, classic (2014); Other surgical history (07/15/2013); Breast biopsy (Left, 07/15/2013); and  section, low transverse ([id number]).     Social History  She reports that she has never smoked. She has never used smokeless tobacco. She reports current alcohol use of about 5.0 standard drinks of alcohol per week. She reports that she does not use drugs.    Family History  Family History[1]     Allergies  Penicillins    ROS  Review of Systems   Constitutional: Negative.    Respiratory: Negative.     Cardiovascular: Negative.    Gastrointestinal: Negative.    Genitourinary: Negative.    Musculoskeletal: Negative.         Last Recorded Vitals  /78 (BP Location: Left arm, Patient Position: Sitting, BP Cuff Size: Small adult)   Pulse 62   Temp 36.7 °C (98.1 °F)   Resp 18   Wt 75.3 kg (166 lb)   SpO2 97%     Physical Exam  Vitals and nursing note reviewed.   Constitutional:       Appearance: Normal appearance.   Eyes:      Pupils: Pupils are equal, round, and reactive to light.   Cardiovascular:      Rate and Rhythm: Normal rate and regular rhythm.      Pulses: Normal  pulses.      Heart sounds: Normal heart sounds.   Pulmonary:      Effort: Pulmonary effort is normal.      Breath sounds: Normal breath sounds.   Neurological:      Mental Status: She is alert.         Relevant Results      Assessment/Plan   Rae was seen today for med refill.  Diagnoses and all orders for this visit:  Benign essential HTN (Primary)  -     potassium chloride CR (Klor-Con M20) 20 mEq ER tablet; Take 1 tablet (20 mEq) by mouth 2 times daily (morning and late afternoon).          COUNSELING      Medication education:              Education:  The patient is counseled regarding potential side-effects of any and all new medications             Understanding:  Patient expressed understanding             Adherence:  No barriers to adherence identified        Debra Rodas, APRN-CNP              [1]   Family History  Problem Relation Name Age of Onset    Other (INFRILTRATING DUCTAL CARCINOMA) Mother Mom         ER & [place]    Breast cancer Mother Mom 62    Lung cancer Mother Mom     Brain cancer Mother Mom     Cancer Mother Mom     Glaucoma Mother Mom     Heart failure Father      Aortic stenosis Father      Heart disease Father      Other (NIDDM) Brother      Asperger's syndrome Daughter      Breast cancer Maternal Grandmother Grandma Karabek 58    Ovarian cancer Maternal Grandmother Grandma Karabek     Breast cancer Paternal Grandmother      Ovarian cancer Other p. cousin 50

## 2025-07-04 DIAGNOSIS — F41.9 ANXIETY: ICD-10-CM

## 2025-07-04 DIAGNOSIS — I10 BENIGN ESSENTIAL HTN: ICD-10-CM

## 2025-07-08 RX ORDER — METOPROLOL SUCCINATE 50 MG/1
50 TABLET, EXTENDED RELEASE ORAL DAILY
Qty: 90 TABLET | Refills: 3 | OUTPATIENT
Start: 2025-07-08

## 2025-07-08 RX ORDER — PAROXETINE 10 MG/1
30 TABLET, FILM COATED ORAL DAILY
Qty: 270 TABLET | Refills: 3 | OUTPATIENT
Start: 2025-07-08

## 2025-07-14 DIAGNOSIS — I10 BENIGN ESSENTIAL HTN: ICD-10-CM

## 2025-07-14 DIAGNOSIS — K21.9 GASTROESOPHAGEAL REFLUX DISEASE, UNSPECIFIED WHETHER ESOPHAGITIS PRESENT: ICD-10-CM

## 2025-07-14 DIAGNOSIS — F41.9 ANXIETY: ICD-10-CM

## 2025-07-14 NOTE — TELEPHONE ENCOUNTER
Refill request sent from pharmacy. Last OV  6/26/25 . Follow up 7/15/25 - [facility] of rxs from her new pcp sent in - please send

## 2025-07-15 ENCOUNTER — APPOINTMENT (OUTPATIENT)
Dept: URGENT CARE | Age: 65
End: 2025-07-15
Payer: COMMERCIAL

## 2025-07-15 ENCOUNTER — OFFICE VISIT (OUTPATIENT)
Dept: PRIMARY CARE | Facility: CLINIC | Age: 65
End: 2025-07-15
Payer: MEDICARE

## 2025-07-15 ENCOUNTER — HOSPITAL ENCOUNTER (OUTPATIENT)
Dept: RADIOLOGY | Facility: HOSPITAL | Age: 65
Discharge: HOME | End: 2025-07-15
Payer: MEDICARE

## 2025-07-15 VITALS
WEIGHT: 167 LBS | DIASTOLIC BLOOD PRESSURE: 78 MMHG | HEIGHT: 64 IN | BODY MASS INDEX: 28.51 KG/M2 | RESPIRATION RATE: 18 BRPM | SYSTOLIC BLOOD PRESSURE: 120 MMHG | HEART RATE: 71 BPM | TEMPERATURE: 98.1 F | OXYGEN SATURATION: 99 %

## 2025-07-15 DIAGNOSIS — S89.92XA INJURY OF LEFT KNEE, INITIAL ENCOUNTER: ICD-10-CM

## 2025-07-15 DIAGNOSIS — S89.92XA INJURY OF LEFT KNEE, INITIAL ENCOUNTER: Primary | ICD-10-CM

## 2025-07-15 PROCEDURE — 99214 OFFICE O/P EST MOD 30 MIN: CPT | Performed by: NURSE PRACTITIONER

## 2025-07-15 PROCEDURE — 3074F SYST BP LT 130 MM HG: CPT | Performed by: NURSE PRACTITIONER

## 2025-07-15 PROCEDURE — 73564 X-RAY EXAM KNEE 4 OR MORE: CPT | Mod: LT

## 2025-07-15 PROCEDURE — 1159F MED LIST DOCD IN RCRD: CPT | Performed by: NURSE PRACTITIONER

## 2025-07-15 PROCEDURE — G2211 COMPLEX E/M VISIT ADD ON: HCPCS | Performed by: NURSE PRACTITIONER

## 2025-07-15 PROCEDURE — 73564 X-RAY EXAM KNEE 4 OR MORE: CPT | Mod: LEFT SIDE | Performed by: RADIOLOGY

## 2025-07-15 PROCEDURE — 1036F TOBACCO NON-USER: CPT | Performed by: NURSE PRACTITIONER

## 2025-07-15 PROCEDURE — 3008F BODY MASS INDEX DOCD: CPT | Performed by: NURSE PRACTITIONER

## 2025-07-15 PROCEDURE — 3078F DIAST BP <80 MM HG: CPT | Performed by: NURSE PRACTITIONER

## 2025-07-15 PROCEDURE — 1125F AMNT PAIN NOTED PAIN PRSNT: CPT | Performed by: NURSE PRACTITIONER

## 2025-07-15 RX ORDER — OMEPRAZOLE 40 MG/1
40 CAPSULE, DELAYED RELEASE ORAL
Qty: 90 CAPSULE | Refills: 0 | Status: SHIPPED | OUTPATIENT
Start: 2025-07-15 | End: 2025-10-13

## 2025-07-15 RX ORDER — METOPROLOL SUCCINATE 50 MG/1
50 TABLET, EXTENDED RELEASE ORAL DAILY
Qty: 90 TABLET | Refills: 0 | Status: SHIPPED | OUTPATIENT
Start: 2025-07-15

## 2025-07-15 RX ORDER — PREDNISONE 20 MG/1
40 TABLET ORAL DAILY
Qty: 10 TABLET | Refills: 0 | Status: SHIPPED | OUTPATIENT
Start: 2025-07-15 | End: 2025-07-20

## 2025-07-15 RX ORDER — POTASSIUM CHLORIDE 20 MEQ/1
20 TABLET, EXTENDED RELEASE ORAL
Qty: 180 TABLET | Refills: 1 | Status: SHIPPED | OUTPATIENT
Start: 2025-07-15

## 2025-07-15 RX ORDER — PAROXETINE 10 MG/1
30 TABLET, FILM COATED ORAL DAILY
Qty: 270 TABLET | Refills: 0 | Status: SHIPPED | OUTPATIENT
Start: 2025-07-15

## 2025-07-15 RX ORDER — HYDROCHLOROTHIAZIDE 25 MG/1
25 TABLET ORAL
Qty: 90 TABLET | Refills: 1 | Status: SHIPPED | OUTPATIENT
Start: 2025-07-15

## 2025-07-15 RX ORDER — AMLODIPINE BESYLATE 10 MG/1
10 TABLET ORAL DAILY
Qty: 90 TABLET | Refills: 0 | Status: SHIPPED | OUTPATIENT
Start: 2025-07-15

## 2025-07-15 ASSESSMENT — ENCOUNTER SYMPTOMS
CONSTITUTIONAL NEGATIVE: 1
JOINT SWELLING: 1
MYALGIAS: 1
CARDIOVASCULAR NEGATIVE: 1
RESPIRATORY NEGATIVE: 1

## 2025-07-15 ASSESSMENT — PATIENT HEALTH QUESTIONNAIRE - PHQ9
1. LITTLE INTEREST OR PLEASURE IN DOING THINGS: NOT AT ALL
SUM OF ALL RESPONSES TO PHQ9 QUESTIONS 1 AND 2: 0
2. FEELING DOWN, DEPRESSED OR HOPELESS: NOT AT ALL

## 2025-07-15 ASSESSMENT — PAIN SCALES - GENERAL: PAINLEVEL_OUTOF10: 5

## 2025-07-15 NOTE — PROGRESS NOTES
"Chief Complaint  Rae Davis is a 65 y.o. female presenting for \"Leg Pain (As of Sunday has been having pain and tightness in left leg-feels like she has a pulled muscle. Pt states she works out a lot and nothing happened when she was doing activities. But this happened when she got up from her couch).\"    HPI     Rae Davis is a 65 y.o. female presenting for knee pain and tightness left leg.  Rest icing, not helping, feels a weird sensation on either side of the knee.  Radiates from bottom of knee up to above the knee, if she gets up fast it hurts, has tried stretching. Not painful, more of a discomfort, but she has to stop when it occurs.  Able to walk but can feel the discomfort, no sharp pain, able to go up and down stairs.   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    Piriformis syndrome, right 09/08/2023    Panic disorder without agoraphobia 09/08/2023    Obstructive sleep apnea, adult 09/08/2023    Combined forms of age-related cataract of both eyes 09/08/2023    Lumbar radiculopathy 09/08/2023    Hypokalemia 09/08/2023        Medications  Current Outpatient Medications   Medication Instructions    amLODIPine (NORVASC) 10 mg, oral, Daily    biotin 10 mg tablet 1 tablet, Daily    cholecalciferol (Vitamin D-3) 25 MCG (1000 UT) tablet 1 capsule, Daily    cyanocobalamin (Vitamin B-12) 100 mcg tablet 1 tablet, Daily    fluticasone (Flonase Allergy Relief) 50 mcg/actuation nasal spray 1 spray, Daily    fosinopril (MONOPRIL) 40 mg, oral, Daily    hydroCHLOROthiazide " (HYDRODIURIL) 25 mg, oral, Daily before breakfast    iron bis-glycinat/vit C/FA/B12 (GENTLE IRON ORAL) Taking 18 mg tab    metoprolol succinate XL (TOPROL-XL) 50 mg, oral, Daily    multivitamin-Ca-iron-minerals tablet Take by mouth. As directed    omeprazole (PRILOSEC) 40 mg, oral, Daily before breakfast    PARoxetine (PAXIL) 30 mg, oral, Daily    potassium chloride CR (Klor-Con M20) 20 mEq ER tablet 20 mEq, oral, 2 times daily (morning and late afternoon)    predniSONE (DELTASONE) 40 mg, oral, Daily        Surgical History  She has a past surgical history that includes Dilation and curettage of uterus (2013); Tonsillectomy (2013); Tubal ligation (2013);  section, classic (2014); Other surgical history (07/15/2013); Breast biopsy (Left, 07/15/2013); and  section, low transverse ([id number]).     Social History  She reports that she has never smoked. She has never been exposed to tobacco smoke. She has never used smokeless tobacco. She reports current alcohol use of about 5.0 standard drinks of alcohol per week. She reports that she does not use drugs.    Family History  Family History[1]     Allergies  Penicillins    ROS  Review of Systems   Constitutional: Negative.    Respiratory: Negative.     Cardiovascular: Negative.    Musculoskeletal:  Positive for joint swelling (left knee) and myalgias.        Last Recorded Vitals  /78 (BP Location: Left arm, Patient Position: Sitting, BP Cuff Size: Small adult)   Pulse 71   Temp 36.7 °C (98.1 °F)   Resp 18   Wt 75.8 kg (167 lb)   SpO2 99%     Physical Exam  Vitals reviewed.   Cardiovascular:      Rate and Rhythm: Normal rate and regular rhythm.      Pulses: Normal pulses.      Heart sounds: Normal heart sounds.   Musculoskeletal:         General: Swelling (left knee) and tenderness present.      Cervical back: Normal range of motion and neck supple.   Neurological:      Mental Status: She is alert.         Relevant  Results      Assessment/Plan   Rae was seen today for leg pain.  Diagnoses and all orders for this visit:  Injury of left knee, initial encounter (Primary)  -     XR knee left 3 views; Future  -     Referral to Physical Therapy; Future  -     predniSONE (Deltasone) 20 mg tablet; Take 2 tablets (40 mg) by mouth once daily for 5 days.          COUNSELING      Medication education:              Education:  The patient is counseled regarding potential side-effects of any and all new medications             Understanding:  Patient expressed understanding             Adherence:  No barriers to adherence identified        Debra Rodas, APRN-CNP              [1]   Family History  Problem Relation Name Age of Onset    Other (INFRILTRATING DUCTAL CARCINOMA) Mother Mom         ER & [place]    Breast cancer Mother Mom 62    Lung cancer Mother Mom     Brain cancer Mother Mom     Cancer Mother Mom     Glaucoma Mother Mom     Heart failure Father      Aortic stenosis Father      Heart disease Father      Other (NIDDM) Brother      Asperger's syndrome Daughter      Breast cancer Maternal Grandmother Grandma Karabek 58    Ovarian cancer Maternal Grandmother Grandma Karabek     Breast cancer Paternal Grandmother      Ovarian cancer Other p. cousin 50

## 2025-07-16 ASSESSMENT — ENCOUNTER SYMPTOMS
MYALGIAS: 1
CONSTITUTIONAL NEGATIVE: 1
RESPIRATORY NEGATIVE: 1
ARTHRALGIAS: 1
CARDIOVASCULAR NEGATIVE: 1

## 2025-07-16 NOTE — PROGRESS NOTES
New patient  History Of Present Illness  Rae Davis is a 65 y.o. female presenting with LEFT knee pain. 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 She was limping a lot and was most painful along the medial aspect of the knee. She went to work Monday and felt her knee was buckling and freezing up but was not too painful. She denies any pops snaps or cracks. She has been treating this injury with rest and heating pad. She works out regularly and goes on walks. She feels more tightness at this point. 5/10 discomfort today. She was evaluated by her PCP and given prednisone taper and xray.  We reviewed patient's previously ordered imaging which shows a mild degenerative joint disease.  Upon exam patient has indications of a mild knee strain/sprain.  Patient denies any trauma to the area so more severe injury such as fracture or meniscus tear is less likely.  As such after discussion we will have patient start into physical therapy and she can follow-up in 4 to 6 weeks for reevaluation and we can adjust treatment as indicated at that time for continued pain or disability.  Patient verbalizes understanding and agreement plan of care.  Past Medical History  She has a past medical history of Age-related nuclear cataract of both eyes, Allergic (1970), Allergy status to unspecified drugs, medicaments and biological substances, Anxiety (1991), Arthritis (2000), Benign neoplasm of pituitary gland (Multi), BRCA1 negative (2020), Chalazion of right upper eyelid, Deficiency of other specified B group vitamins, Degenerative myopia, bilateral, Fourth nerve palsy of left eye, GERD (gastroesophageal reflux disease) (2022), Hypertension (1990), Ocular pain, right, Personal history of diseases of the skin and subcutaneous tissue, Personal history of other diseases of the circulatory system, Personal history of other diseases of the nervous system  "and sense organs, Personal history of other diseases of the nervous system and sense organs, Preglaucoma, unspecified, bilateral, Radiculopathy, lumbar region (2013), and Unspecified disorder of binocular movement.    She has no past medical history of Personal history of irradiation.    Surgical History  She has a past surgical history that includes Dilation and curettage of uterus (2013); Tonsillectomy (2013); Tubal ligation (2013);  section, classic (2014); Other surgical history (07/15/2013); Breast biopsy (Left, 07/15/2013); and  section, low transverse ([id number]).     Social History  She reports that she has never smoked. She has never been exposed to tobacco smoke. She has never used smokeless tobacco. She reports current alcohol use of about 5.0 standard drinks of alcohol per week. She reports that she does not use drugs.    Family History  Family History[1]     Allergies  Penicillins    Review of Systems  Review of Systems   Constitutional: Negative.    HENT: Negative.     Respiratory: Negative.     Cardiovascular: Negative.    Musculoskeletal:  Positive for arthralgias and myalgias.   All other systems reviewed and are negative.       Last Recorded Vitals  BP (!) 165/99   Pulse 64   Ht 1.626 m (5' 4\")   Wt 75.8 kg (167 lb)   BMI 28.67 kg/m²      The , ED ORTIZ was present during today's visit and not limited to physical examination!    Examination:  Left Knee  Edema: Negative  Effusion: Negative.   Percussion Test: Negative  Tuning Fork Test: Negative  Ecchymosis/Bruising: Negative.            Palpation:   Positive Tender to palpation medial joint line, patellar tract and patellar tendon    Orientation:  Negative           Range of Motion:   Knee Flexion ( 0-135 degrees)   Knee Extension ( 0-15 degrees)         Muscle Strength: Positive   +4/+5 Quadricep Extension Causes pain  +5/+5  Hamstring Flexion           +5+5 Gastrocnemius  +5+5 Soleus. "            Proprioception:        Pain with Squat: Positive    Pain with Sumo Squat:  Positive    Hop Test: Positive    Single leg balance test Positive            Vascular:   +2/+4 Femoral  +2/+4 Dorsalis Pedis  +2/+4 Posterior Tibial  Capillary Refill less than 2 seconds.     Knee - ACL:  Anterior Drawer Test: Negative   Lachman Test: Negative   Lelli Test:  Negative       KNEE - MCL / LCL:  Valgus Stress Test:  90 degrees: Negative, 20-30 degrees: Negative.   Varus Stress Test:  90 degrees: Negative  20-30 degrees: Negative   Apley Distraction Test: Negative   Thessaly Test: Negative        KNEE - IT BAND:  Estephania Test: Negative.   Noble Test: Negative.         KNEE - MENISCUS:  Apley Compression Test: Negative  Stienmann Test: Negative  Bernice Test: Negative   Bounce Home Test: Negative   Thessaly Test:  Negative            KNEE - PATELLA:  Apprehension Test: Negative  Glide Test:  Positive   Grind Test: Positive   Patella Tracking Test: Positive     KNEE - PCL/POSTERIOR LATERAL CORNER:  Posterior Drawer Test: Negative  Dial Test: Negative  Reverse Lachman Test: Negative     KNEE - POPLITEUS:  Rene's Test: Negative              KNEE - QUADRICEPS:    VMO Test: Positive    VLO Test:  Negative.         Hip/Pelvis - Sacrum:  Leg Length Supine: Negative  Leg Length Supine to Seated (Derbolowsky Sign): Negative  Standing Flexion Test: Negative  Seated Flexion Test: Negative  Spring Test: Negative  Sacral Somatic Dysfunction: Negative  Hip Flexor Tightness: Positive   Hamstring Tightness: Positive         Feet/Foot: Positive BILATERAL Valgus foot     Imaging and Diagnostics Review:  Previously ordered imaging independently reviewed    Assessment   1. Primary osteoarthritis of left knee    2. Acute pain of left knee    3. Patellofemoral pain syndrome of left knee    4. Quadriceps strain, left, initial encounter    5. Valgus foot deformity, acquired, left        Plan   Treatment or Intervention:  May use PRICE therapy  as needed.  Start into Physical Therapy 1-2 times a week for 8-10 weeks with manual therapy as well as dry needling and IASTM  Stressed the importance of wearing shoes with good stability control to help with the biomechanics affecting the lower legs  Stressed the importance of wearing full foot insoles to help with the biomechanics affecting the lower legs  Recommendation over-the-counter calcium 500mg, 3 times a day with vitamin-D3 [id number]+ units a day with food as well as a daily multivitamin  Recommendation over-the-counter Move Free for joint health  May take OTC Tylenol Extra Strength or OTC Tylenol Arthritis, taking one every 6-8 hours with food as needed for pain management.  Patient advised regarding the risk and/or potential adverse reactions and/or side effects of any prescribed medications along with any over-the-counter medications or any supplements used. Patient advised to seek immediate medical care if any adverse reactions occur. The patient and/or patient(s) parent(s) verbalized their understanding.  Discussed in detail with the patient to the level of their understanding the possibility in the future of regenerative injections versus corticosteroids injections  Possibility in future of MRI of LEFT KNEE to rule out tendon vs ligament vs tear vs fracture vs other, MSK to read.     Diagnostic studies:  XR knee left 4+ views  Order date: 7/15/2025  Authorizing: JODIE Kemp  Ordered by JODIE Kemp on 7/15/2025.     Narrative & Impression    Interpreted By:  Rahul Ahn,   STUDY:  XR KNEE LEFT 4+ VIEWS; ;  7/15/2025 3:16 pm      INDICATION:  Signs/Symptoms:pain in MCL area.      ,S89.92XA Unspecified injury of left lower leg, initial encounter      COMPARISON:  None.      ACCESSION NUMBER(S):  [id number]      ORDERING CLINICIAN:  CAROLINA SWEENEY      FINDINGS:  Mild osteophytosis in all 3 compartments. There is a small effusion.  There is no joint space narrowing       IMPRESSION:  Mild left knee osteoarthritis without acute abnormality          MACRO:  None      Signed by: Rahul Ahn 7/16/2025 7:21 PM  Dictation workstation:   [id number]          Activity Instructions, Restrictions, and Accommodations:      Consultations/Referrals:  Physical therapy    Follow-up:  Follow up 6-8 weeks  Total appointment time _45_ minutes. Greater than 50% spent counseling patient on results of physical exam, treatment options as well as results of ordered imaging and treatment for results, need for PT and expected outcomes, as well as discussing possible medications.    Jalil Childers CNP           [1]   Family History  Problem Relation Name Age of Onset    Other (INFRILTRATING DUCTAL CARCINOMA) Mother Mom         ER & [place]    Breast cancer Mother Mom 62    Lung cancer Mother Mom     Brain cancer Mother Mom     Cancer Mother Mom     Glaucoma Mother Mom     Heart failure Father      Aortic stenosis Father      Heart disease Father      Other (NIDDM) Brother      Asperger's syndrome Daughter      Breast cancer Maternal Grandmother Grandma Karabek 58    Ovarian cancer Maternal Grandmother Grandma Karabek     Breast cancer Paternal Grandmother      Ovarian cancer Other p. cousin 50

## 2025-07-16 NOTE — PATIENT INSTRUCTIONS
May use PRICE therapy as needed.  Start into Physical Therapy 1-2 times a week for 8-10 weeks with manual therapy as well as dry needling and IASTM  Stressed the importance of wearing shoes with good stability control to help with the biomechanics affecting the lower legs  Stressed the importance of wearing full foot insoles to help with the biomechanics affecting the lower legs  Recommendation over-the-counter calcium 500mg, 3 times a day with vitamin-D3 [id number]+ units a day with food as well as a daily multivitamin  Recommendation over-the-counter Move Free for joint health  May take OTC Tylenol Extra Strength or OTC Tylenol Arthritis, taking one every 6-8 hours with food as needed for pain management.  Patient advised regarding the risk and/or potential adverse reactions and/or side effects of any prescribed medications along with any over-the-counter medications or any supplements used. Patient advised to seek immediate medical care if any adverse reactions occur. The patient and/or patient(s) parent(s) verbalized their understanding.  Discussed in detail with the patient to the level of their understanding the possibility in the future of regenerative injections versus corticosteroids injections  Possibility in future of MRI of LEFT KNEE to rule out tendon vs ligament vs tear vs fracture vs other, MSK to read.   Follow up 6-8 weeks

## 2025-07-18 DIAGNOSIS — I10 BENIGN ESSENTIAL HTN: ICD-10-CM

## 2025-07-18 RX ORDER — FOSINOPRIL SODIUM 40 MG/1
40 TABLET ORAL DAILY
Qty: 90 TABLET | Refills: 1 | Status: SHIPPED | OUTPATIENT
Start: 2025-07-18

## 2025-07-22 ENCOUNTER — OFFICE VISIT (OUTPATIENT)
Dept: ORTHOPEDIC SURGERY | Facility: CLINIC | Age: 65
End: 2025-07-22
Payer: MEDICARE

## 2025-07-22 VITALS
DIASTOLIC BLOOD PRESSURE: 99 MMHG | BODY MASS INDEX: 28.51 KG/M2 | HEART RATE: 64 BPM | SYSTOLIC BLOOD PRESSURE: 165 MMHG | HEIGHT: 64 IN | WEIGHT: 167 LBS

## 2025-07-22 DIAGNOSIS — M22.2X2 PATELLOFEMORAL PAIN SYNDROME OF LEFT KNEE: ICD-10-CM

## 2025-07-22 DIAGNOSIS — S76.112A QUADRICEPS STRAIN, LEFT, INITIAL ENCOUNTER: ICD-10-CM

## 2025-07-22 DIAGNOSIS — M25.562 ACUTE PAIN OF LEFT KNEE: ICD-10-CM

## 2025-07-22 DIAGNOSIS — M17.12 PRIMARY OSTEOARTHRITIS OF LEFT KNEE: Primary | ICD-10-CM

## 2025-07-22 DIAGNOSIS — M21.072 VALGUS FOOT DEFORMITY, ACQUIRED, LEFT: ICD-10-CM

## 2025-07-22 PROCEDURE — 3008F BODY MASS INDEX DOCD: CPT | Performed by: NURSE PRACTITIONER

## 2025-07-22 PROCEDURE — 99213 OFFICE O/P EST LOW 20 MIN: CPT | Performed by: NURSE PRACTITIONER

## 2025-07-22 PROCEDURE — 3077F SYST BP >= 140 MM HG: CPT | Performed by: NURSE PRACTITIONER

## 2025-07-22 PROCEDURE — 1159F MED LIST DOCD IN RCRD: CPT | Performed by: NURSE PRACTITIONER

## 2025-07-22 PROCEDURE — 3080F DIAST BP >= 90 MM HG: CPT | Performed by: NURSE PRACTITIONER

## 2025-07-22 PROCEDURE — 99204 OFFICE O/P NEW MOD 45 MIN: CPT | Performed by: NURSE PRACTITIONER

## 2025-07-22 PROCEDURE — 1036F TOBACCO NON-USER: CPT | Performed by: NURSE PRACTITIONER

## 2025-07-22 PROCEDURE — 1125F AMNT PAIN NOTED PAIN PRSNT: CPT | Performed by: NURSE PRACTITIONER

## 2025-07-22 ASSESSMENT — PAIN SCALES - GENERAL
PAINLEVEL_OUTOF10: 5 - MODERATE PAIN
PAINLEVEL_OUTOF10: 5

## 2025-07-22 ASSESSMENT — PAIN - FUNCTIONAL ASSESSMENT: PAIN_FUNCTIONAL_ASSESSMENT: 0-10

## 2025-07-22 ASSESSMENT — PAIN DESCRIPTION - DESCRIPTORS: DESCRIPTORS: ACHING;SORE

## 2025-08-01 ENCOUNTER — APPOINTMENT (OUTPATIENT)
Dept: RADIOLOGY | Facility: HOSPITAL | Age: 65
End: 2025-08-01
Payer: COMMERCIAL

## 2025-08-01 ENCOUNTER — HOSPITAL ENCOUNTER (EMERGENCY)
Facility: HOSPITAL | Age: 65
Discharge: HOME | End: 2025-08-01
Payer: COMMERCIAL

## 2025-08-01 ENCOUNTER — EVALUATION (OUTPATIENT)
Dept: PHYSICAL THERAPY | Facility: CLINIC | Age: 65
End: 2025-08-01
Payer: COMMERCIAL

## 2025-08-01 VITALS
SYSTOLIC BLOOD PRESSURE: 155 MMHG | DIASTOLIC BLOOD PRESSURE: 84 MMHG | HEIGHT: 64 IN | BODY MASS INDEX: 28.51 KG/M2 | TEMPERATURE: 97.2 F | RESPIRATION RATE: 18 BRPM | OXYGEN SATURATION: 96 % | HEART RATE: 61 BPM | WEIGHT: 167 LBS

## 2025-08-01 DIAGNOSIS — M79.89 LEFT LEG SWELLING: Primary | ICD-10-CM

## 2025-08-01 DIAGNOSIS — M25.562 LEFT KNEE PAIN: Primary | ICD-10-CM

## 2025-08-01 DIAGNOSIS — M79.605 LEFT LEG PAIN: ICD-10-CM

## 2025-08-01 PROCEDURE — 93971 EXTREMITY STUDY: CPT

## 2025-08-01 PROCEDURE — 99284 EMERGENCY DEPT VISIT MOD MDM: CPT | Mod: 25

## 2025-08-01 PROCEDURE — 93971 EXTREMITY STUDY: CPT | Mod: FOREIGN READ | Performed by: RADIOLOGY

## 2025-08-01 PROCEDURE — 97162 PT EVAL MOD COMPLEX 30 MIN: CPT | Mod: GP

## 2025-08-01 ASSESSMENT — ENCOUNTER SYMPTOMS
OCCASIONAL FEELINGS OF UNSTEADINESS: 0
DEPRESSION: 0
LOSS OF SENSATION IN FEET: 0
PAIN SCALE: 0

## 2025-08-01 ASSESSMENT — PAIN - FUNCTIONAL ASSESSMENT: PAIN_FUNCTIONAL_ASSESSMENT: 0-10

## 2025-08-01 ASSESSMENT — PAIN SCALES - GENERAL
PAINLEVEL_OUTOF10: 0 - NO PAIN
PAINLEVEL_OUTOF10: 0 - NO PAIN

## 2025-08-01 NOTE — ED TRIAGE NOTES
L leg swelling send to ED by the PT/OT. Pt state that she had a Fall on July 13 and after Ortho & PCP appointment she was recommended to have PT/OT. Today in PT she present with some swelling in L lower extremity. Pt never notice that she have a swelling.

## 2025-08-01 NOTE — DISCHARGE INSTRUCTIONS
Please follow with your primary care provider.  Utilize compression and elevation.    Be sure to take all medications, over the counter medications or prescription medications only as directed.    Be sure to follow up as directed in 1-2 days. All of the details of your follow up instructions are detailed in the follow up section of this packet.    If you are being discharged with any pains medications or muscle relaxers (norco, Vicodin, hydrocodone products, Percocet, oxycodone products, flexeril, cyclobenzaprine, robaxin, norflex, brand or generic, or any other pain controlling medications with the exception of Ibuprofen and regular Tylenol, do not drive or operate machinery, climb ladders or participate in any activity that could potentially put yourself or others at risk should you get dizzy, or be/feel impaired at all.    Return to emergency room without delay for ANY new or worsening pains or for any other symptoms or concerns. Return with worsening pains, nausea, vomiting, trouble breathing, palpitations, shortness of breath, inability to pass stool or urine, loss of control of stool or urine, any numbness or tingling (that is not normal for you), uncontrolled fevers, the passing of blood or other material in stool or urine, rashes, pains or for any other symptoms or concerns you may have. You are always welcome to return to the ER at any time for any reason or for any other concerns you may have.

## 2025-08-01 NOTE — PROGRESS NOTES
"Physical Therapy Evaluation and Treatment     Patient Name: Rae Davis  MRN: [id number]  Encounter date: 2025  Time Calculation  Start Time: 902  Stop Time: 935  Time Calculation (min): 33 min  PT Evaluation Time Entry  PT Evaluation (Moderate) Time Entry: 33    Visit # 1 of 7  Visits/Dates Authorized: 2025  MMO: TS: NO AUTH/ $2500 DEDUCTIBLE (MET)/ 100% COVERAGE/ 20 VISITS (0 USED)/ $3500 OOP (NOT MET)/ Transaction ID: [id number]   2.) MEDICARE A/B: TS: $257 DED MET/80% COVERAGE/MN VISITS/ AMOUNT USED $0 PT/ST     Current Problem:   Problem List Items Addressed This Visit    None  Visit Diagnoses         Codes      Left knee pain    -  Primary M25.562    Relevant Orders    Follow Up In Physical Therapy      Left leg pain     M79.605    Relevant Orders    Follow Up In Physical Therapy          Precautions:history of DVT, take LEFS next session, Measure L knee strength and L ankle motion next session        PMHX: broke R foot, history of DVT  X-ray R knee 7/15/25: Mild osteophytosis in all 3 compartments. There is a small effusion.  There is no joint space narrowing  Subjective Evaluation    History of Present Illness  Mechanism of injury: Pt reported that she was working in the yard on 25. She did not notice anything till later. Pt's L knee felt sore and it would feel \"twitchy\". She was given predinsone from her PCP and was using a knee brace. Pt has been exercising. She currently has extreme tightness in the back of her L leg. Pt did mention mid way through  that she has a history of a DVT. She has results from [facility] in  showing positive results for a DVT in LLE. Pt was on elquis in the past, but is not currently on a blood thinner     Pain  Current pain ratin    Patient Goals  Patient goal: \"avoid surgery-further injury\"            Objective     Tenderness     Additional Tenderness Details  Medial posterior knee/calf tender to touch     Active Range of Motion   Left Hip "   Flexion: WFL    Right Hip   Flexion: WFL  Left Knee   Flexion: WFL    Right Knee   Flexion: WFL    Strength/Myotome Testing     Left Hip   Planes of Motion   Flexion: 5  Extension: 5  Abduction: 5    Right Hip   Planes of Motion   Flexion: 5  Extension: 5  Abduction: 5    Swelling     Left Knee Girth Measurement (cm)   10 cm below joint line: 40 cm    Right Knee Girth Measurement (cm)   10 cm below joint line: 37 cm    General Comments     Knee Comments  Swelling present in  L calf/ankle               Objective     Tenderness     Additional Tenderness Details  Medial posterior knee/calf tender to touch     Active Range of Motion   Left Hip   Flexion: WFL    Right Hip   Flexion: WFL  Left Knee   Flexion: WFL    Right Knee   Flexion: WFL    Strength/Myotome Testing     Left Hip   Planes of Motion   Flexion: 5  Extension: 5  Abduction: 5    Right Hip   Planes of Motion   Flexion: 5  Extension: 5  Abduction: 5    Swelling     Left Knee Girth Measurement (cm)   10 cm below joint line: 40 cm    Right Knee Girth Measurement (cm)   10 cm below joint line: 37 cm    General Comments     Knee Comments  Swelling present in  L calf/ankle        Objective     Tenderness     Additional Tenderness Details  Medial posterior knee/calf tender to touch     Active Range of Motion   Left Hip   Flexion: WFL    Right Hip   Flexion: WFL  Left Knee   Flexion: WFL    Right Knee   Flexion: WFL    Strength/Myotome Testing     Left Hip   Planes of Motion   Flexion: 5  Extension: 5  Abduction: 5    Right Hip   Planes of Motion   Flexion: 5  Extension: 5  Abduction: 5    Swelling     Left Knee Girth Measurement (cm)   10 cm below joint line: 40 cm    Right Knee Girth Measurement (cm)   10 cm below joint line: 37 cm    General Comments     Knee Comments  Swelling present in  L calf/ankle               Other Measures  Lower Extremity Funtional Score (LEFS): 118    Treatments:       HEP / Access Codes:   Next session     Assessment & Plan  "    Assessment  Impairments: lacks appropriate home exercise program and pain with function  Assessment details: Due to concerns of DVT, IE was stopped and PT walked pt to ER in hospital. Pt reported pain in L calf/posterior knee and complained of tightness. Unable to perform full IE, but will continue assessment next time if pt is cleared from DVT  Prognosis: good    Goals  \"avoid surgery-further injury\"    Plan  Therapy options: will be seen for skilled physical therapy services  Planned modality interventions: cryotherapy and thermotherapy (hydrocollator packs)  Planned therapy interventions: abdominal trunk stabilization, balance/weight-bearing training, flexibility, functional ROM exercises, gait training, home exercise program, therapeutic activities, stretching, strengthening, neuromuscular re-education and manual therapy  Frequency: 1x week  Duration in visits: 6  Duration in weeks: 6  Treatment plan discussed with: patient  Plan details: Pt educated on risk and concerns for DVT, following up with physician if DVT is present        Moderate complexity due to patient's clinical presentation being evolving with changing characteristics, with comorbidities/complexities to include history of DVT, concerns for current DVT, all of which may negatively impact rehab tolerance and progression.     Post-Treatment Symptoms:       Goals:   Active       PT Problem       PT Goal 1       Start:  08/01/25    Expected End:  08/22/25       Pt will be independent with HEP to ensure benefit outside of PT         PT Goal 2       Start:  08/01/25    Expected End:  09/12/25       Pt will report minimal to no L knee/leg tightness to allow pt to be able to walk, perform ADLs, without difficulty     Pt will improve score on LEFS (not taken this session) to allow pt to be able to walk, perform ADLs, without difficulty     Pt will demonstrated L knee strength of 5/5 to allow pt to be able to walk, perform ADLs, without difficulty      "

## 2025-08-01 NOTE — Clinical Note
Rae Davis was seen and treated in our emergency department on 8/1/2025.  She may return to work on 08/04/2025.       If you have any questions or concerns, please don't hesitate to call.      Ronak Antonio PA-C

## 2025-08-01 NOTE — ED PROVIDER NOTES
HPI   Chief Complaint   Patient presents with    Leg Swelling       Patient is a 65-year-old female present with left lower extremity swelling.  Reportedly has a history of leg swelling and without formal ultrasound was started on Eliquis by the PCP remotely.  She was at physical therapy today and a PT had noticed it so sent her here for an ultrasound.  No recent surgeries.  No recent travel.  States that she is having no pain at the area.  Does have chronic varicose veins particular in the left lower extremity.  Has seen a vein specialist for this.  Patient denies shortness of breath when lying flat or on exertion.  No history of congestive heart failure.  Patient denies fevers, chills, cough, sore throat, runny nose, chest pain, shortness of breath, abdominal pain, nausea, vomiting, diarrhea or urinary complaints.              Patient History   Medical History[1]  Surgical History[2]  Family History[3]  Social History[4]    Physical Exam   ED Triage Vitals   Temperature Heart Rate Respirations BP   08/01/25 0855 08/01/25 0855 08/01/25 0855 08/01/25 0857   36.2 °C (97.2 °F) 69 18 155/83      Pulse Ox Temp src Heart Rate Source Patient Position   08/01/25 0855 -- 08/01/25 0855 08/01/25 0855   100 %  Monitor Sitting      BP Location FiO2 (%)     08/01/25 0855 --     Left arm        Physical Exam  Vitals and nursing note reviewed.   Constitutional:       Appearance: She is well-developed.      Comments: Awake, sitting in examination chair   HENT:      Head: Normocephalic and atraumatic.      Nose: Nose normal.      Mouth/Throat:      Mouth: Mucous membranes are moist.      Pharynx: Oropharynx is clear.     Eyes:      Extraocular Movements: Extraocular movements intact.      Conjunctiva/sclera: Conjunctivae normal.      Pupils: Pupils are equal, round, and reactive to light.       Cardiovascular:      Rate and Rhythm: Normal rate and regular rhythm.      Pulses: Normal pulses.      Heart sounds: Normal heart sounds. No  murmur heard.  Pulmonary:      Effort: Pulmonary effort is normal. No respiratory distress.      Breath sounds: Normal breath sounds.   Abdominal:      General: Abdomen is flat.      Palpations: Abdomen is soft.      Tenderness: There is no abdominal tenderness.     Musculoskeletal:         General: Swelling present. Normal range of motion.      Cervical back: Normal range of motion and neck supple.      Comments: Left lower extremity swelling, no pitting edema.     Skin:     General: Skin is warm and dry.      Capillary Refill: Capillary refill takes less than 2 seconds.      Comments: DP, PT pulse strong intact to the bilateral lower extremity     Neurological:      General: No focal deficit present.      Mental Status: She is alert and oriented to person, place, and time.     Psychiatric:         Mood and Affect: Mood normal.         Behavior: Behavior normal.           ED Course & MDM   Diagnoses as of 08/02/25 1057   Left leg swelling                 No data recorded     Devendra Coma Scale Score: 15 (08/01/25 0857 : Everett Morrison RN)                           Medical Decision Making  Patient is a 65-year-old female presenting with left lower extremity swelling.  Ultrasound ordered.  Conditions considered include but are not limited: DVT, fluid overload.    Attending physician was available for consultation on this patient.  Ultrasound is negative.  Patient's leg Ace wrap by me.  Patient neurovascularly intact prior to and after Ace wrap application.  I believe this patient is at low risk for complication, and a disposition of discharge is acceptable.  Return to the Emergency Department if new or worsening symptoms including headache, fever, chills, chest pain, shortness of breath, syncope, near syncope, abdominal pain, nausea, vomiting,  diarrhea, or worsening pain.  Patient is agreeable to disposition of discharge and to follow with respective fields in the next several days.    Portions of this note made  with Dragon software, please be mindful of potential grammatical errors.      Lower extremity venous duplex left   Final Result   No evidence for DVT within the left lower extremity.   Greater saphenous thrombus seen on prior ultrasound from 2020   is not appreciated on this exam.   Signed by Joseluis Dodge MD            Procedure  Procedures       [1]   Past Medical History:  Diagnosis Date    Age-related nuclear cataract of both eyes     Allergic     Allergy status to unspecified drugs, medicaments and biological substances     History of allergy    Anxiety     Arthritis     Benign neoplasm of pituitary gland (Multi)     Prolactinoma    BRCA1 negative     Chalazion of right upper eyelid     Deficiency of other specified B group vitamins     Vitamin B12 deficiency    Degenerative myopia, bilateral     Fourth nerve palsy of left eye     GERD (gastroesophageal reflux disease)     Hypertension 1990    Ocular pain, right     Personal history of diseases of the skin and subcutaneous tissue     History of rosacea    Personal history of other diseases of the circulatory system     History of hypertension    Personal history of other diseases of the nervous system and sense organs     History of migraine headaches    Personal history of other diseases of the nervous system and sense organs     History of glaucoma    Preglaucoma, unspecified, bilateral     Radiculopathy, lumbar region 2013    Lumbar radiculopathy    Unspecified disorder of binocular movement    [2]   Past Surgical History:  Procedure Laterality Date    BREAST BIOPSY Left 07/15/2013    Biopsy Breast Open     SECTION, CLASSIC  2014     Section     SECTION, LOW TRANSVERSE  [id number]    DILATION AND CURETTAGE OF UTERUS  2013    Dilation And Curettage    OTHER SURGICAL HISTORY  07/15/2013    Nasal Endoscopy Polypectomy    TONSILLECTOMY  2013    Tonsillectomy    TUBAL LIGATION   06/18/2013    Tubal Ligation   [3]   Family History  Problem Relation Name Age of Onset    Other (INFRILTRATING DUCTAL CARCINOMA) Mother Mom         ER & VT    Breast cancer Mother Mom 62    Lung cancer Mother Mom     Brain cancer Mother Mom     Cancer Mother Mom     Glaucoma Mother Mom     Heart failure Father      Aortic stenosis Father      Heart disease Father      Other (NIDDM) Brother      Asperger's syndrome Daughter      Breast cancer Maternal Grandmother Grandma Karabek 58    Ovarian cancer Maternal Grandmother Grandma Karabek     Breast cancer Paternal Grandmother      Ovarian cancer Other p. cousin 50   [4]   Social History  Tobacco Use    Smoking status: Never     Passive exposure: Never    Smokeless tobacco: Never   Vaping Use    Vaping status: Never Used   Substance Use Topics    Alcohol use: Yes     Alcohol/week: 5.0 standard drinks of alcohol     Types: 1 Shots of liquor per week     Comment: Depends on occasion    Drug use: Never        Ronak Antonio PA-C  08/02/25 1057

## 2025-08-12 ENCOUNTER — TREATMENT (OUTPATIENT)
Dept: PHYSICAL THERAPY | Facility: CLINIC | Age: 65
End: 2025-08-12
Payer: COMMERCIAL

## 2025-08-12 DIAGNOSIS — M25.562 LEFT KNEE PAIN: ICD-10-CM

## 2025-08-12 DIAGNOSIS — M79.605 LEFT LEG PAIN: ICD-10-CM

## 2025-08-12 PROCEDURE — 97110 THERAPEUTIC EXERCISES: CPT | Mod: GP,CQ

## 2026-02-04 ENCOUNTER — APPOINTMENT (OUTPATIENT)
Dept: RADIOLOGY | Facility: CLINIC | Age: 66
End: 2026-02-04
Payer: COMMERCIAL

## 2026-02-04 DIAGNOSIS — Z12.31 SCREENING MAMMOGRAM FOR BREAST CANCER: ICD-10-CM
